# Patient Record
Sex: FEMALE | Race: BLACK OR AFRICAN AMERICAN | NOT HISPANIC OR LATINO | Employment: STUDENT | URBAN - METROPOLITAN AREA
[De-identification: names, ages, dates, MRNs, and addresses within clinical notes are randomized per-mention and may not be internally consistent; named-entity substitution may affect disease eponyms.]

---
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
## 2020-01-06 ENCOUNTER — OFFICE VISIT (OUTPATIENT)
Dept: ENDOCRINOLOGY | Facility: CLINIC | Age: 20
End: 2020-01-06
Payer: COMMERCIAL

## 2020-01-06 VITALS
BODY MASS INDEX: 21.66 KG/M2 | HEART RATE: 86 BPM | DIASTOLIC BLOOD PRESSURE: 53 MMHG | WEIGHT: 138 LBS | SYSTOLIC BLOOD PRESSURE: 109 MMHG | HEIGHT: 67 IN

## 2020-01-06 DIAGNOSIS — E10.9 WELL CONTROLLED TYPE 1 DIABETES MELLITUS (H): Primary | ICD-10-CM

## 2020-01-06 LAB — HBA1C MFR BLD: 5.1 % (ref 4.3–6)

## 2020-01-06 RX ORDER — INSULIN ASPART 100 [IU]/ML
INJECTION, SOLUTION INTRAVENOUS; SUBCUTANEOUS
Qty: 15 ML | Refills: 3 | Status: SHIPPED | OUTPATIENT
Start: 2020-01-06 | End: 2020-08-27

## 2020-01-06 ASSESSMENT — MIFFLIN-ST. JEOR: SCORE: 1433.59

## 2020-01-06 ASSESSMENT — PAIN SCALES - GENERAL: PAINLEVEL: NO PAIN (0)

## 2020-01-06 NOTE — PROGRESS NOTES
"Reason for visit/consult: Type 1 DM, post-pregnancy     Primary care provider: Engelmann, Lauren Anneliese     HPI:  Today's A1c is 5.1%  20 yo female with Type 1 DM, mostly controlled on diet alone, who I last saw 3/12/19. She has seen Soni Daniel on 10/14/19, see quote below:  \"Devin does understand that she has autoimmune type 1 diabetes and that she is likely honeymooning.  She understands that she did not have gestational diabetes and that it is very important to continue monitoring her glucose on the dexcom sensor.  She has a plan to take Novolog correction if her sugar goes over 175 mg/dL (1/75 over 175).  She has not required carb coverage or Lantus.  She has been wearing her dexcom G6 sensor with an overall average of 93 mg/dL the past week.  She has had no hypoglycemia, but reports she feels shaky if she does not eat.  She reports having very low appetite.  She is almost back to pre-pregnancy weight, which she is excited about. She feels good.  Her last a1c was 5.6%.\"    Devin has delivered a baby girl on 10/3/19 named Lavinia. Post-delivery she required no insulin. She is breastfeeding exclusively. She is not currently taking anything for her diabetes. She is feeling fine without any complaints.    She is using Dexcom to monitor glucose. She saw Rosibel in Diabetes Education in the past and benefited greatly from this.  Her blood sugars have been mostly ok with exception of some post-prandial hyperglycemia to 180s about 8x during the period 12/8/19-1/6/2020.  Average 108, very low 0.35, low 2.7%, in target 68.6%, high 28.7%, very high 0%, coefficient of variation 24.7%, SD 27, % time CGM active 91.4%.    Past Medical/Surgical History:  Past Medical History:   Diagnosis Date     Type I diabetes mellitus (H)     Recently Dx: Jan 2019     Past Surgical History:   Procedure Laterality Date     NO HISTORY OF SURGERY         Allergies:  No Known Allergies    Current Medications   Current Outpatient Medications " "  Medication     acetaminophen (TYLENOL) 325 MG tablet     Ascorbic Acid (VITAMIN C PO)     blood glucose (NO BRAND SPECIFIED) test strip     blood glucose (NO BRAND SPECIFIED) test strip     blood glucose (NO BRAND SPECIFIED) test strip     blood glucose (ONETOUCH VERIO IQ) test strip     blood glucose monitoring (NO BRAND SPECIFIED) meter device kit     Blood Pressure Monitoring (BLOOD PRESSURE CUFF) MISC     Cholecalciferol (VITAMIN D-3) 125 MCG (5000 UT) TABS     Continuous Blood Gluc Sensor (DEXCOM G6 SENSOR) MISC     glucagon (GLUCAGON EMERGENCY) 1 MG kit     Insulin Aspart (NOVOLOG SC)     insulin pen needle (32G X 4 MM) 32G X 4 MM miscellaneous     Prenatal Vit-Fe Fumarate-FA (TRINATE) TABS     No current facility-administered medications for this visit.        Family History:  Family History   Problem Relation Age of Onset     Gestational Diabetes Mother      Diabetes Type 2  Maternal Grandmother      Heart Disease Father      Hypertension Father      Glaucoma No family hx of      Macular Degeneration No family hx of        Social History:  In school pursuing nursing and is thinking about medical school, she is getting .    Social History     Tobacco Use     Smoking status: Never Smoker     Smokeless tobacco: Never Used   Substance Use Topics     Alcohol use: No     ROS:  negative except as mentioned in HPI    Exam  not currently breastfeeding.   /53   Pulse 86   Ht 1.702 m (5' 7\")   Wt 62.6 kg (138 lb)   BMI 21.61 kg/m    Gen: well appearing, nad, pleasant and conversant  HEENT: anicteric, EOMI, no proptosis or lid lag  Neuro: A&Ox3, no tremor    Labs/Imaging    ICA amparo <1:4 (on 2/14/19)  GAD65 amparo 72.1 (0.0-5.0)    ENDO DIABETES Latest Ref Rng & Units 10/11/2019   HEMOGLOBIN A1C 0 - 5.6 %    HEMOGLOBIN A1 95.0 - 97.9 %    HEMOGLOBIN A1C, POC 4.3 - 6 %    HGB 11.7 - 15.7 g/dL 12.1   GLUCOSE 70 - 99 mg/dL    CREATININE 0.50 - 1.00 mg/dL    POTASSIUM 3.4 - 5.3 mmol/L    ALT 0 - 50 U/L  "   AST 0 - 35 U/L    WBC 4.0 - 11.0 10e9/L 8.3   RBC 3.8 - 5.2 10e12/L 4.82   HGB 11.7 - 15.7 g/dL 12.1   HCT 35.0 - 47.0 % 38.5   MCV 78 - 100 fl 80   MCH 26.5 - 33.0 pg 25.1 (L)   MCHC 31.5 - 36.5 g/dL 31.4 (L)   RDW 10.0 - 15.0 % 16.6 (H)    - 450 10e9/L 440     Creatinine   Date Value Ref Range Status   10/08/2019 0.63 0.50 - 1.00 mg/dL Final     Lab Results   Component Value Date    A1C 6.0 04/05/2019    A1C 7.6 03/06/2019    A1C 8.8 02/18/2019    A1C 12.0 01/12/2019       Assessment and Plan    Type 1 DM, sub-optimal control, honeymooning and is requiring no insulin at present, however, her glucose gets up to 180s.  She feels comfortable wearing her dexcom sensor regularly. She is willing to begin taking some meal time insulin and using a correction scale when necessary and will follow up with Rosibel again.    Patient Instructions:    Start meal time insulin 1 unit per 30 grams of carbohydrates and a low intensity correction scale    Please see NATANAEL Gleason, Diabetes Education so you can get an insulin pump if you want one eventually    Please use the following correction scale:    LOW Insulin Resistance or Correction Factor of 1 for 100 mg/dL  Correction Scale    Do Not give Correction Insulin if Pre-Meal BG less than 140.   For Pre-Meal  - 239 give 1 unit.   For Pre-Meal  - 339 give 2 units.   For Pre-Meal BG greater than or equal to 340 give 3 units.   To be given with prandial insulin, and based on pre-meal blood glucose.   Notify provider if glucose greater than or equal to 350 mg/dL after administration of correction dose.     LOW Insulin Resistance or Correction Factor of 1 for 100 mg/dL  Do Not give Bedtime Correction Insulin if BG less than 200.   For  - 299 give 1 unit.   For  - 399 give 2 units   For BG greater than or equal 400 give 3 units.   Notify provider if glucose greater than or equal to 350 mg/dL after administration of correction dose.    Fasting blood sugar  goals are <130  2 hours after eating blood sugar goals are: <180    For questions about blood sugars, please call [phone] anytime & ask the  to page diabetes on-call.    RTC: Diabetes Education as needed, 1-3 months with Diabetes Team PA, 6 months Endocrinologist      Peyton Correia MD, MPH  Attending Physician  Diabetes/Endocrinology/Metabolism    Time: 30 min spent on evaluation, management, counseling, education, & motivational interviewing with greater than 50% of the total time was spent on counseling and coordinating care

## 2020-01-06 NOTE — LETTER
"1/6/2020       RE: Fabio Lacey  [address]  [place] [address]     Dear Colleague,    Thank you for referring your patient, Fabio Lacey, to the [facility] ENDOCRINOLOGY at [facility]. Please see a copy of my visit note below.    Reason for visit/consult: Type 1 DM, post-pregnancy     Primary care provider: Engelmann, Lauren Anneliese     HPI:  Today's A1c is 5.1%  20 yo female with Type 1 DM, mostly controlled on diet alone, who I last saw 3/12/19. She has seen Soni Daniel on 10/14/19, see quote below:  \"Devin does understand that she has autoimmune type 1 diabetes and that she is likely honeymooning.  She understands that she did not have gestational diabetes and that it is very important to continue monitoring her glucose on the dexcom sensor.  She has a plan to take Novolog correction if her sugar goes over 175 mg/dL (1/75 over 175).  She has not required carb coverage or Lantus.  She has been wearing her dexcom G6 sensor with an overall average of 93 mg/dL the past week.  She has had no hypoglycemia, but reports she feels shaky if she does not eat.  She reports having very low appetite.  She is almost back to pre-pregnancy weight, which she is excited about. She feels good.  Her last a1c was 5.6%.\"    Devin has delivered a baby girl on 10/3/19 named Lavinia. Post-delivery she required no insulin. She is breastfeeding exclusively. She is not currently taking anything for her diabetes. She is feeling fine without any complaints.    She is using Dexcom to monitor glucose. She saw Rosibel in Diabetes Education in the past and benefited greatly from this.  Her blood sugars have been  mostly ok with exception of some post-prandial hyperglycemia to 180s about 8x during the period 12/8/19-1/6/2020.  Average 108, very low 0.35, low 2.7%, in target 68.6%, high 28.7%, very high 0%, coefficient of variation 24.7%, SD 27, % time CGM active 91.4%.    Past " "Medical/Surgical History:  Past Medical History:   Diagnosis Date     Type I diabetes mellitus (H)     Recently Dx: Jan 2019     Past Surgical History:   Procedure Laterality Date     NO HISTORY OF SURGERY         Allergies:  No Known Allergies    Current Medications   Current Outpatient Medications   Medication     acetaminophen (TYLENOL) 325 MG tablet     Ascorbic Acid (VITAMIN C PO)     blood glucose (NO BRAND SPECIFIED) test strip     blood glucose (NO BRAND SPECIFIED) test strip     blood glucose (NO BRAND SPECIFIED) test strip     blood glucose (ONETOUCH VERIO IQ) test strip     blood glucose monitoring (NO BRAND SPECIFIED) meter device kit     Blood Pressure Monitoring (BLOOD PRESSURE CUFF) MISC     Cholecalciferol (VITAMIN D-3) 125 MCG (5000 UT) TABS     Continuous Blood Gluc Sensor (DEXCOM G6 SENSOR) MISC     glucagon (GLUCAGON EMERGENCY) 1 MG kit     Insulin Aspart (NOVOLOG SC)     insulin pen needle (32G X 4 MM) 32G X 4 MM miscellaneous     Prenatal Vit-Fe Fumarate-FA (TRINATE) TABS     No current facility-administered medications for this visit.        Family History:  Family History   Problem Relation Age of Onset     Gestational Diabetes Mother      Diabetes Type 2  Maternal Grandmother      Heart Disease Father      Hypertension Father      Glaucoma No family hx of      Macular Degeneration No family hx of        Social History:  In school pursuing nursing and is thinking about medical school, she is getting .    Social History     Tobacco Use     Smoking status: Never Smoker     Smokeless tobacco: Never Used   Substance Use Topics     Alcohol use: No     ROS:  negative except as mentioned in HPI    Exam  not currently breastfeeding.   /53   Pulse 86   Ht 1.702 m (5' 7\")   Wt 62.6 kg (138 lb)   BMI 21.61 kg/m     Gen: well appearing, nad, pleasant and conversant  HEENT: anicteric, EOMI, no proptosis or lid lag  Neuro: A&Ox3, no tremor    Labs/Imaging    ICA amparo <1:4 (on " 2/14/19)  GAD65 amparo 72.1 (0.0-5.0)    ENDO DIABETES Latest Ref Rng & Units 10/11/2019   HEMOGLOBIN A1C 0 - 5.6 %    HEMOGLOBIN A1 95.0 - 97.9 %    HEMOGLOBIN A1C, POC 4.3 - 6 %    HGB 11.7 - 15.7 g/dL 12.1   GLUCOSE 70 - 99 mg/dL    CREATININE 0.50 - 1.00 mg/dL    POTASSIUM 3.4 - 5.3 mmol/L    ALT 0 - 50 U/L    AST 0 - 35 U/L    WBC 4.0 - 11.0 10e9/L 8.3   RBC 3.8 - 5.2 10e12/L 4.82   HGB 11.7 - 15.7 g/dL 12.1   HCT 35.0 - 47.0 % 38.5   MCV 78 - 100 fl 80   MCH 26.5 - 33.0 pg 25.1 (L)   MCHC 31.5 - 36.5 g/dL 31.4 (L)   RDW 10.0 - 15.0 % 16.6 (H)    - 450 10e9/L 440     Creatinine   Date Value Ref Range Status   10/08/2019 0.63 0.50 - 1.00 mg/dL Final     Lab Results   Component Value Date    A1C 6.0 04/05/2019    A1C 7.6 03/06/2019    A1C 8.8 02/18/2019    A1C 12.0 01/12/2019   Assessment and Plan    Type 1 DM, sub-optimal control, honeymooning and is requiring no insulin at present, however, her glucose gets up to 180s.  She feels comfortable wearing her dexcom sensor regularly. She is willing to begin taking some meal time insulin and using a correction scale when necessary and will follow up with Rosibel again.    Patient Instructions:    Start meal time insulin 1 unit per 30 grams of carbohydrates and a low intensity correction scale    Please see NATANAEL Gleason, Diabetes Education so you can get an insulin pump if you want one eventually    Please use the following correction scale:    LOW Insulin Resistance or Correction Factor of 1 for 100 mg/dL  Correction Scale    Do Not give Correction Insulin if Pre-Meal BG less than 140.   For Pre-Meal  - 239 give 1 unit.   For Pre-Meal  - 339 give 2 units.   For Pre-Meal BG greater than or equal to 340 give 3 units.   To be given with prandial insulin, and based on pre-meal blood glucose.   Notify provider if glucose greater than or equal to 350 mg/dL after administration of correction dose.     LOW Insulin Resistance or Correction Factor of 1 for 100  mg/dL  Do Not give Bedtime Correction Insulin if BG less than 200.   For  - 299 give 1 unit.   For  - 399 give 2 units   For BG greater than or equal 400 give 3 units.   Notify provider if glucose greater than or equal to 350 mg/dL after administration of correction dose.    Fasting blood sugar goals are <130  2 hours after eating blood sugar goals are: <180    For questions about blood sugars, please call [phone] anytime & ask the  to page diabetes on-call.    RTC:  Diabetes Education as needed, 1-3 months with Diabetes Team PA, 6 months Endocrinologist      Peyton Correia MD, MPH  Attending Physician  Diabetes/Endocrinology/Metabolism    Time: 30 min spent on evaluation, management, counseling, education, & motivational interviewing with greater than 50% of the total time was spent on counseling and coordinating care

## 2020-01-06 NOTE — PATIENT INSTRUCTIONS
Start meal time insulin 1 unit per 30 grams of carbohydrates and a low intensity correction scale    Please see NATANAEL Gleason, Diabetes Education so you can get an insulin pump if you want one eventually    Please use the following correction scale:    LOW Insulin Resistance or Correction Factor of 1 for 100 mg/dL  Correction Scale    Do Not give Correction Insulin if Pre-Meal BG less than 140.   For Pre-Meal  - 239 give 1 unit.   For Pre-Meal  - 339 give 2 units.   For Pre-Meal BG greater than or equal to 340 give 3 units.   To be given with prandial insulin, and based on pre-meal blood glucose.   Notify provider if glucose greater than or equal to 350 mg/dL after administration of correction dose.     LOW Insulin Resistance or Correction Factor of 1 for 100 mg/dL  Do Not give Bedtime Correction Insulin if BG less than 200.   For  - 299 give 1 unit.   For  - 399 give 2 units   For BG greater than or equal 400 give 3 units.   Notify provider if glucose greater than or equal to 350 mg/dL after administration of correction dose.    Fasting blood sugar goals are <130  2 hours after eating blood sugar goals are: <180    For questions about blood sugars, please call [phone] anytime & ask the  to page diabetes on-call.

## 2020-01-28 ENCOUNTER — TELEPHONE (OUTPATIENT)
Dept: ENDOCRINOLOGY | Facility: CLINIC | Age: 20
End: 2020-01-28

## 2020-01-28 NOTE — TELEPHONE ENCOUNTER
[facility] Call Center    Phone Message    May a detailed message be left on voicemail: yes    Reason for Call: Medication Question or concern regarding medication   Prescription Clarification  Name of Medication: NOVOLOG FLEXPEN 100 UNIT/ML soln  Prescribing Provider: Peyton conley    Pharmacy:      [facility] - [place], [place] - [address]     What on the order needs clarification? They need to know daily dose or max number of units for insurance          Action Taken: Message routed to:  [facility] stephani

## 2020-02-12 ENCOUNTER — MEDICAL CORRESPONDENCE (OUTPATIENT)
Dept: HEALTH INFORMATION MANAGEMENT | Facility: CLINIC | Age: 20
End: 2020-02-12

## 2020-02-12 ENCOUNTER — MYC MEDICAL ADVICE (OUTPATIENT)
Dept: OBGYN | Facility: CLINIC | Age: 20
End: 2020-02-12

## 2020-02-13 NOTE — TELEPHONE ENCOUNTER
Called patient to discuss further. Pt describes (external) vulvar itching, inflammation, peeling skin. Denies use of new cleansing products. Denies shaving affected area. Has experienced occasional incontinence. Informed pt could be moisture related. Advised pt to clean and dry affected area. Can trial a barrier cream today but recommend clinic appt for further evaluation. Pt expressed understanding and scheduled with Dr. Giraldo tomorrow.

## 2020-03-11 ENCOUNTER — HEALTH MAINTENANCE LETTER (OUTPATIENT)
Age: 20
End: 2020-03-11

## 2020-03-20 ENCOUNTER — TELEPHONE (OUTPATIENT)
Dept: ENDOCRINOLOGY | Facility: CLINIC | Age: 20
End: 2020-03-20

## 2020-03-20 NOTE — TELEPHONE ENCOUNTER
Soni does not work on Fridays and patients employer is requesting letter today and forms by Tuesday.     Soni, I mimicked a letter written for another patient and have the forms for you to sign on Monday

## 2020-03-20 NOTE — TELEPHONE ENCOUNTER
[facility] Call Center    Phone Message    May a detailed message be left on voicemail: yes     Reason for Call: Form or Letter   Type or form/letter needing completion: Pt requires a note to employer that it is safe for her to continue to work.  She believes her boss is trying to fire her based on her diagnosis as a vunerable person during COVID 19.  Provider: Soni Daniel PA-C  Date form needed: today  Once completed: Fax form to: get number from Pt      Pt is in tears and terrifies of losing her job.  Please call back right away    Action Taken: Message routed to:  [facility] endocrine    Travel Screening: Not Applicable

## 2020-03-20 NOTE — TELEPHONE ENCOUNTER
[facility] Call Center    Phone Message    May a detailed message be left on voicemail: yes     Reason for Call: Other: Pt caled back and stated a nurse left a VM for pt. Pt is calling back. Please call back pt Thanks.     Action Taken: Message routed to:  [facility] ENDO    Travel Screening: Not Applicable

## 2020-03-23 ENCOUNTER — MYC MEDICAL ADVICE (OUTPATIENT)
Dept: ENDOCRINOLOGY | Facility: CLINIC | Age: 20
End: 2020-03-23

## 2020-03-29 NOTE — TELEPHONE ENCOUNTER
Patient called with concern r/t blood sugars in recent days. On continuous glucose monitoring and average has been 200. Pt states she spoke with endocrine who advised (per patient report) levels not concerning and take insulin in adjusted doses for blood sugar (sliding scale) as ordered. Patient does not feel comfortable with this and is also symptomatic of nausea and generally feeling unwell.     Discussed with Zoë Armstrong and Dr. Scott in clinic who advise pt come to clinic for OB appt. Scheduled today.   
no

## 2020-03-30 ENCOUNTER — VIRTUAL VISIT (OUTPATIENT)
Dept: ENDOCRINOLOGY | Facility: CLINIC | Age: 20
End: 2020-03-30
Payer: COMMERCIAL

## 2020-03-30 DIAGNOSIS — L08.9 LOCAL INFECTION OF SKIN AND SUBCUTANEOUS TISSUE: ICD-10-CM

## 2020-03-30 DIAGNOSIS — E10.9 WELL CONTROLLED TYPE 1 DIABETES MELLITUS (H): Primary | ICD-10-CM

## 2020-03-30 RX ORDER — INSULIN ASPART 100 [IU]/ML
INJECTION, SOLUTION INTRAVENOUS; SUBCUTANEOUS
Qty: 15 ML | Refills: 3 | Status: SHIPPED | OUTPATIENT
Start: 2020-03-30 | End: 2022-04-26

## 2020-03-30 NOTE — PROGRESS NOTES
"This visit was converted from an in person visit to a virtual visit due to the ongoing COVID-19 pandemic.    Start time: 1338  End time: 1404    HPI: Devin is a 18 yo woman here for follow up of type 1 diabetes, diagnosed just prior to pregnancy in January, 2019. She also sees Dr. Correia.  Since delivery, she has required very little insulin and she continues wearing a dexcom sensor.      She has been taking 1/30g with \"very sugary foods.\" No long acting insulin.  She feels sugars are higher.  She is not taking as much insulin as she feels she should be.      She is no longer with the father of her baby.  She will be moving into her mom's house soon.  Mom is moving to [facility] in May and taking her daughter, Lavinia.  Devin plans to have her daughter back once she finishes college and plans to visit [place] whenever possible.  She feels this is the best possible arrangement for now.      Devin does understand that she has autoimmune type 1 diabetes and that she is likely honeymooning.  She understands that she did not have gestational diabetes and that it is very important to continue monitoring her glucose on the dexcom sensor.  She has been wearing her dexcom G6 sensor with an overall average of 135 mg/dL the past week (up from 93 mg/dL at her last visit).  She has high spikes after eating (up as high as 190) and occasional hypoglycemia after eating.  She feels good. Her last a1c was 5.1% on 1/6/20.       Past Medical History:   Diagnosis Date     Type I diabetes mellitus (H)     Recently Dx: Jan 2019       Past Surgical History:   Procedure Laterality Date     NO HISTORY OF SURGERY         Family History   Problem Relation Age of Onset     Gestational Diabetes Mother      Diabetes Type 2  Maternal Grandmother      Heart Disease Father      Hypertension Father      Glaucoma No family hx of      Macular Degeneration No family hx of    Maternal grandmother-   Social Hx:   since 2018.  Lives with Swathi, her " boyfriend. Taking classes at [employer]. She is [place], but was born in [facility].  Came to the [place] at age 4.     Social History     Socioeconomic History     Marital status:      Spouse name: Swathi Castro     Number of children: None     Years of education: None     Highest education level: None   Occupational History     Occupation: Student    Social Needs     Financial resource strain: None     Food insecurity:     Worry: None     Inability: None     Transportation needs:     Medical: None     Non-medical: None   Tobacco Use     Smoking status: Never Smoker     Smokeless tobacco: Never Used   Substance and Sexual Activity     Alcohol use: No     Drug use: No     Sexual activity: Yes     Partners: Male   Lifestyle     Physical activity:     Days per week: None     Minutes per session: None     Stress: None   Relationships     Social connections:     Talks on phone: None     Gets together: None     Attends Yarsani service: None     Active member of club or organization: None     Attends meetings of clubs or organizations: None     Relationship status: None     Intimate partner violence:     Fear of current or ex partner: None     Emotionally abused: None     Physically abused: None     Forced sexual activity: None   Other Topics Concern     None   Social History Narrative    How much exercise per week? none    How much calcium per day? PNV, some foods        How much caffeine per day? none    How much vitamin D per day? PNV    Do you/your family wear seatbelts?  Yes    Do you/your family use safety helmets? n/a    Do you/your family use sunscreen? When in sun    Do you/your family keep firearms in the home? No    Do you/your family have a smoke detector(s)? Yes        March 6, 2019 Adrien James LPN       Current Outpatient Medications   Medication     acetaminophen (TYLENOL) 325 MG tablet     Ascorbic Acid (VITAMIN C PO)     blood glucose (NO BRAND SPECIFIED) test strip     blood glucose (NO BRAND SPECIFIED) test strip      blood glucose (NO BRAND SPECIFIED) test strip     blood glucose (ONETOUCH VERIO IQ) test strip     blood glucose monitoring (NO BRAND SPECIFIED) meter device kit     Blood Pressure Monitoring (BLOOD PRESSURE CUFF) MISC     Cholecalciferol (VITAMIN D-3) 125 MCG (5000 UT) TABS     Continuous Blood Gluc Sensor (DEXCOM G6 SENSOR) MISC     glucagon (GLUCAGON EMERGENCY) 1 MG kit     Insulin Aspart (NOVOLOG SC)     insulin pen needle (32G X 4 MM) 32G X 4 MM miscellaneous     NOVOLOG FLEXPEN 100 UNIT/ML soln     Prenatal Vit-Fe Fumarate-FA (TRINATE) TABS     No current facility-administered medications for this visit.         No Known Allergies    Physical Exam- none.  Phone visit.   RESULTS  Lab Results   Component Value Date    A1C 6.0 (H) 04/05/2019    A1C 7.6 (H) 03/06/2019    A1C 8.8 (H) 02/18/2019    A1C 12.0 (H) 01/12/2019    HEMOGLOBINA1 5.1 01/06/2020    HEMOGLOBINA1 5.1 07/12/2019    HEMOGLOBINA1 5.3 05/24/2019    HEMOGLOBINA1 5.7 04/01/2019    HEMOGLOBINA1 10.8 (A) 01/23/2019       TSH   Date Value Ref Range Status   04/05/2019 1.59 0.40 - 4.00 mU/L Final   05/14/2018 1.48 0.40 - 4.00 mU/L Final       ALT   Date Value Ref Range Status   10/10/2019 25 0 - 50 U/L Final   10/08/2019 31 0 - 50 U/L Final   ]    No results for input(s): CHOL, HDL, LDL, TRIG, CHOLHDLRATIO in the last 86536 hours.    Lab Results   Component Value Date     10/07/2019      Lab Results   Component Value Date    POTASSIUM 3.6 10/07/2019     Lab Results   Component Value Date    CHLORIDE 108 10/07/2019     Lab Results   Component Value Date    UNA 8.3 10/07/2019     Lab Results   Component Value Date    CO2 25 10/07/2019     Lab Results   Component Value Date    BUN 12 10/07/2019     Lab Results   Component Value Date    CR 0.63 10/08/2019       GFR Estimate   Date Value Ref Range Status   10/08/2019 >90 >60 mL/min/[1.73_m2] Final     Comment:     Non  GFR Calc  Starting 12/18/2018, serum creatinine based estimated  GFR (eGFR) will be   calculated using the Chronic Kidney Disease Epidemiology Collaboration   (CKD-EPI) equation.     10/07/2019 >90 >60 mL/min/[1.73_m2] Final     Comment:     Non  GFR Calc  Starting 12/18/2018, serum creatinine based estimated GFR (eGFR) will be   calculated using the Chronic Kidney Disease Epidemiology Collaboration   (CKD-EPI) equation.     10/01/2019 >90 >60 mL/min/[1.73_m2] Final     Comment:     Non  GFR Calc  Starting 12/18/2018, serum creatinine based estimated GFR (eGFR) will be   calculated using the Chronic Kidney Disease Epidemiology Collaboration   (CKD-EPI) equation.       GFR Estimate If Black   Date Value Ref Range Status   10/08/2019 >90 >60 mL/min/[1.73_m2] Final     Comment:      GFR Calc  Starting 12/18/2018, serum creatinine based estimated GFR (eGFR) will be   calculated using the Chronic Kidney Disease Epidemiology Collaboration   (CKD-EPI) equation.     10/07/2019 >90 >60 mL/min/[1.73_m2] Final     Comment:      GFR Calc  Starting 12/18/2018, serum creatinine based estimated GFR (eGFR) will be   calculated using the Chronic Kidney Disease Epidemiology Collaboration   (CKD-EPI) equation.     10/01/2019 >90 >60 mL/min/[1.73_m2] Final     Comment:      GFR Calc  Starting 12/18/2018, serum creatinine based estimated GFR (eGFR) will be   calculated using the Chronic Kidney Disease Epidemiology Collaboration   (CKD-EPI) equation.         No results found for: MICROL  No results found for: MICROALBUMIN  Lab Results   Component Value Date    CPEPT 3.1 07/10/2019    GADAB 72.1 (H) 02/14/2019    ISCAB <1:4 02/14/2019       No results found for: B12]    Most recent eye exam date: : Not Found       GFR Estimate   Date Value Ref Range Status   10/08/2019 >90 >60 mL/min/[1.73_m2] Final     Comment:     Non  GFR Calc  Starting 12/18/2018, serum creatinine based estimated GFR (eGFR) will be    calculated using the Chronic Kidney Disease Epidemiology Collaboration   (CKD-EPI) equation.     10/07/2019 >90 >60 mL/min/[1.73_m2] Final     Comment:     Non  GFR Calc  Starting 12/18/2018, serum creatinine based estimated GFR (eGFR) will be   calculated using the Chronic Kidney Disease Epidemiology Collaboration   (CKD-EPI) equation.     10/01/2019 >90 >60 mL/min/[1.73_m2] Final     Comment:     Non  GFR Calc  Starting 12/18/2018, serum creatinine based estimated GFR (eGFR) will be   calculated using the Chronic Kidney Disease Epidemiology Collaboration   (CKD-EPI) equation.       GFR Estimate If Black   Date Value Ref Range Status   10/08/2019 >90 >60 mL/min/[1.73_m2] Final     Comment:      GFR Calc  Starting 12/18/2018, serum creatinine based estimated GFR (eGFR) will be   calculated using the Chronic Kidney Disease Epidemiology Collaboration   (CKD-EPI) equation.     10/07/2019 >90 >60 mL/min/[1.73_m2] Final     Comment:      GFR Calc  Starting 12/18/2018, serum creatinine based estimated GFR (eGFR) will be   calculated using the Chronic Kidney Disease Epidemiology Collaboration   (CKD-EPI) equation.     10/01/2019 >90 >60 mL/min/[1.73_m2] Final     Comment:      GFR Calc  Starting 12/18/2018, serum creatinine based estimated GFR (eGFR) will be   calculated using the Chronic Kidney Disease Epidemiology Collaboration   (CKD-EPI) equation.         No results found for: MICROL  No results found for: MICROALBUMIN  Lab Results   Component Value Date    CPEPT 3.1 07/10/2019    GADAB 72.1 (H) 02/14/2019    ISCAB <1:4 02/14/2019       No results found for: B12]    Most recent eye exam date: : Not Found     Glutamic Acid Decarboxylase Antibody   Date Value Ref Range Status   02/14/2019 72.1 (H) 0.0 - 5.0 IU/mL Final     Comment:     (Note)  INTERPRETIVE INFORMATION:  Glutamic Acid Decarboxylase   Antibody  A value greater than 5.0 IU/mL  is considered positive for   Glutamic Acid Decarboxylase Antibody (KEISHA Ab). This assay   is intended for the semi-quantitative determination of the   KEISHA Ab in human serum. Results should be interpreted within   the context of clinical symptoms.  Performed by [facility],  [address] [facility] [address] [phone]  [email or web address], Solomon Clark MD, Lab. Director       C Peptide   Date Value Ref Range Status   07/10/2019 3.1 0.9 - 6.9 ng/mL Final         Assessment/Plan:     1.  Type 1 diabetes- Sugars are climbing a bit, particularly post-prandially.  Discussed IN-pen and she is interested.  Will set up with Rosibel Garcia.  Will also start taking 1/30g carb plus correction of 1/100 over 175 mg/dL.  We spent much time discussing the fact that this is type 1 diabetes and NOT GDM and that she could go into DKA if she does not have insulin.  She is likely honeymooning, and her insulin requirements could increase rapidly, particularly in the context of illness.       She feels comfortable wearing her dexcom sensor regularly and watching her glucose closely.  Will contact me if she sees her glucose values going over 140 mg/dL.     2. Nose pain- following piercing.  Photos shows small amout of redness and drainage.  Asked her to cleanse area with soapy water, apply antibiotic ointment.  If not improving, will start cephalexin. I sent rx to pharmacy.  She will let me know if symptoms persist or worsen.     3.  F/U in 3 months, sooner with hyperglycemia.     I spent 26 minutes with this patient on the phone and explained the conditions and plans (more than 50% of time was counseling/coordination of care, diabetes management, follow up plan for worsening hyper and hypoglycemia) . The patient understood and is satisfied with today's visit.      Soni Daniel PA-C, MPAS   [facility]  Department of Medicine  Division of Endocrinology and Diabetes

## 2020-03-30 NOTE — PATIENT INSTRUCTIONS
Download In-pen ricky on your phone.      Set up appointment with Rosibel to set up the inpen.      1/30g  Correction - 100   Target glucose- 120

## 2020-04-01 RX ORDER — CEPHALEXIN 500 MG/1
500 TABLET ORAL 4 TIMES DAILY
Qty: 28 TABLET | Refills: 0 | Status: SHIPPED | OUTPATIENT
Start: 2020-04-01 | End: 2022-03-21

## 2020-04-07 ENCOUNTER — TELEPHONE (OUTPATIENT)
Dept: ENDOCRINOLOGY | Facility: CLINIC | Age: 20
End: 2020-04-07

## 2020-04-07 NOTE — TELEPHONE ENCOUNTER
Sent [facility] for pt to set up 3 month follow up with Soni, and to cancel/reschedule Dr Bren garcíat

## 2020-04-21 ENCOUNTER — TELEPHONE (OUTPATIENT)
Dept: ENDOCRINOLOGY | Facility: CLINIC | Age: 20
End: 2020-04-21

## 2020-04-21 NOTE — TELEPHONE ENCOUNTER
Prior Authorization Retail Medication Request    Medication/Dose: InPen  ICD code (if different than what is on RX): E10.9  Rationale: Patient needs in accurate dosage of insulin to manage diabetes     Insurance Name: [facility]  Insurance ID: [id number]       Pharmacy Information (if different than what is on RX)  Name:    Phone:

## 2020-04-22 NOTE — TELEPHONE ENCOUNTER
PA Initiation    Medication: InPen 100-Grey- Rip -   Insurance Company: [employer] - Phone [phone] Fax [phone]  Pharmacy Filling the Rx: [id number] - [place], [place] - [address] [place] [address]  Filling Pharmacy Phone: [phone]  Filling Pharmacy Fax: [phone]  Start Date: 4/22/2020

## 2020-04-23 ENCOUNTER — TELEPHONE (OUTPATIENT)
Dept: ENDOCRINOLOGY | Facility: CLINIC | Age: 20
End: 2020-04-23

## 2020-04-23 NOTE — TELEPHONE ENCOUNTER
Prior Authorization Approval    Medication: InPen 100-Grey- Rip - APPROVED was approved on 4/22/2020  Effective: 3/23/2020 to 4/22/2021  Reference #:    Approved Dose/Quantity:   Insurance Company: [employer] - Phone [phone] Fax [phone]  Expected CoPay:    Pharmacy Filling the Rx: [id number] - [place], [place] - [address]  Pharmacy Notified: Yes  Patient Notified: Comment:  **Instructed pharmacy to notify patient when script is ready to /ship.**

## 2020-04-23 NOTE — TELEPHONE ENCOUNTER
"Prior Authorization Specialty Medication Request    Medication/Dose: Injection Device for insulin (INPEN 100-GREY-JANET) TALI   ICD code (if different than what is on RX):    Previously Tried and Failed:      Important Lab Values:   Hemoglobin A1C  4.3 - 6 %  5.1       Rationale: 1.  Type 1 diabetes- Sugars are climbing a bit, particularly post-prandially.  Discussed IN-pen and she is interested.  Will set up with Rosibel Radha.  Will also start taking 1/30g carb plus correction of 1/100 over 175 mg/dL.  We spent much time discussing the fact that this is type 1 diabetes and NOT GDM and that she could go into DKA if she does not have insulin.     Insurance Name:   Insurance ID:   Insurance Phone Number:     Pharmacy Information (if different than what is on RX)  Name:    Phone:                RE    From: Rosibel Garcia <[email or web address]>  Sent: Monday, April 20, 2020 3:02 PM  To: Latisha Huang <[email or web address]>  Subject: Fw: InPen PA needed     Tana Good, Can you forward to PA team?  Thanks,  Rosibel  From: Nitza Gould <[email or web address]>  Sent: Monday, April 20, 2020 11:48 AM  To: Rosibel Garcia <[email or web address]>  Subject: InPen PA needed     CAUTION: This email originated from outside of the organization. Do not click links or open attachments unless you recognize the sender and know the content is safe.  WARNING: The sender of this email could not be validated and may not match the person in the \"From\" field. When possible, visit any website by directly typing it in a web browser, instead of clicking links, for added safety.  Edwar Gleason     I had Stefany Page reach out to me for an InPen patient of Soni Mccarthy 'Devin' [facility].   Phone number: [phone]    She has [employer] and can get 2 InPens for $0 once a PA is submitted/approved. Can you submit one or ask Latisha to?     Thanks!   -Nitza Gould    " Medical  [place]     Cell: [phone]  [email or web address]      Youtube Page:   [email or web address]    This e-mail and any files transmitted with it is intended solely for the addressee(s) and may contain confidential and/or   legally privileged information.  If you are not the addressee(s), any disclosure, reproduction, copying, distribution or other   use of this e-mail is prohibited.  If you have received this e-mail in error, please delete it and notify the sender  immediately via return E-mail.

## 2020-05-18 ENCOUNTER — MYC MEDICAL ADVICE (OUTPATIENT)
Dept: ENDOCRINOLOGY | Facility: CLINIC | Age: 20
End: 2020-05-18

## 2020-05-27 ENCOUNTER — TELEPHONE (OUTPATIENT)
Dept: ENDOCRINOLOGY | Facility: CLINIC | Age: 20
End: 2020-05-27

## 2020-05-27 DIAGNOSIS — E10.9 WELL CONTROLLED TYPE 1 DIABETES MELLITUS (H): ICD-10-CM

## 2020-05-27 NOTE — TELEPHONE ENCOUNTER
----- Message from Soni Daniel PA-C sent at 5/23/2020 12:59 PM CDT -----  Regarding: In-pen PA status  I talked to Devin and she said she still has not received the In-pen.  Can you please follow up on this?   Thank you!  Soni

## 2020-06-19 ENCOUNTER — TELEPHONE (OUTPATIENT)
Dept: ENDOCRINOLOGY | Facility: CLINIC | Age: 20
End: 2020-06-19

## 2020-06-19 DIAGNOSIS — E10.9 WELL CONTROLLED TYPE 1 DIABETES MELLITUS (H): Primary | ICD-10-CM

## 2020-06-21 RX ORDER — INSULIN ASPART 100 [IU]/ML
INJECTION, SOLUTION INTRAVENOUS; SUBCUTANEOUS
Qty: 15 ML | Refills: 3 | Status: SHIPPED | OUTPATIENT
Start: 2020-06-21 | End: 2020-11-28

## 2020-07-08 ENCOUNTER — OFFICE VISIT (OUTPATIENT)
Dept: OBGYN | Facility: CLINIC | Age: 20
End: 2020-07-08
Attending: OBSTETRICS & GYNECOLOGY
Payer: COMMERCIAL

## 2020-07-08 ENCOUNTER — OFFICE VISIT (OUTPATIENT)
Dept: OBGYN | Facility: CLINIC | Age: 20
End: 2020-07-08
Attending: ADVANCED PRACTICE MIDWIFE
Payer: COMMERCIAL

## 2020-07-08 VITALS
SYSTOLIC BLOOD PRESSURE: 107 MMHG | HEIGHT: 67 IN | DIASTOLIC BLOOD PRESSURE: 67 MMHG | BODY MASS INDEX: 21.52 KG/M2 | HEART RATE: 84 BPM | WEIGHT: 137.1 LBS

## 2020-07-08 DIAGNOSIS — B37.31 YEAST INFECTION OF THE VAGINA: Primary | ICD-10-CM

## 2020-07-08 DIAGNOSIS — Z30.46 NEXPLANON REMOVAL: Primary | ICD-10-CM

## 2020-07-08 PROCEDURE — 11982 REMOVE DRUG IMPLANT DEVICE: CPT | Mod: ZF | Performed by: ADVANCED PRACTICE MIDWIFE

## 2020-07-08 RX ORDER — FLUCONAZOLE 150 MG/1
TABLET ORAL
Qty: 3 TABLET | Refills: 0 | Status: SHIPPED | OUTPATIENT
Start: 2020-07-08 | End: 2022-03-21

## 2020-07-08 RX ORDER — CLOBETASOL PROPIONATE 0.5 MG/G
CREAM TOPICAL 2 TIMES DAILY
Qty: 60 G | Refills: 3 | Status: SHIPPED | OUTPATIENT
Start: 2020-07-08 | End: 2022-03-21

## 2020-07-08 ASSESSMENT — MIFFLIN-ST. JEOR: SCORE: 1424.51

## 2020-07-08 NOTE — LETTER
7/8/2020       RE: Fabio Lacey  [address]  [facility] [address]     Dear Colleague,    Thank you for referring your patient, Fabio Lacey, to the [facility] at [facility]. Please see a copy of my visit note below.      Nexplanon Removal:     Is a pregnancy test required: No.  Was a consent obtained?  Yes    Fabio Lacey is here for removal of etonogestrel implant Nexplanon/Implanon    Indication: pt no longer desires contraception    Pt counseled by Dr. Wright.   No questions or concerns about procedure.    Preoperative Diagnosis: etonogestrel implant  Postoperative Diagnosis: etonogestrel implant removed    Technique: On the right arm  Skin prep Betadine  Anesthesia 1% lidocaine  Procedure: Small incision (<5mm) was made at distal end of palpable implant, curved hemostat or mosquito forceps was used to isolate the implant and bring it to the incision, the fibrous capsule containing the implant  was incised and the Implant was removed intact.    EBL: minimal  Complications:  No  Tolerance:  Pt tolerated procedure well and was in stable condition.   Dressing:    A pressure bandage was placed for the next 12-24 hours.    Contraception was discussed and patient chose the following method: abstinence      Follow up:     Encouraged to monitor for signs of infection. Pt was instructed to call if bleeding, severe pain or foul smell.   Return to clinic in 1 year for pap/annual, earlier prn    ADA Mosqueda CNM

## 2020-07-08 NOTE — PROGRESS NOTES
19 yo  on Nexplanon desires check up for persistent yeast sx and labial irritation.     OB History    Para Term  AB Living   1 1 1 0 0 1   SAB TAB Ectopic Multiple Live Births   0 0 0 0 1      # Outcome Date GA Lbr Paresh/2nd Weight Sex Delivery Anes PTL Lv   1 Term 10/03/19 37w2d 09:49 / 00:52 3.232 kg (7 lb 2 oz) F Vag-Spont EPI N LORENZO      Name: LAURA SINGH      Apgar1: 9  Apgar5: 9     Past Medical History:   Diagnosis Date     Type I diabetes mellitus (H)     Recently Dx: 2019     Social History     Socioeconomic History     Marital status: Single     Spouse name: Swathi Singh     Number of children: None     Years of education: None     Highest education level: None   Occupational History     Occupation: Student    Social Needs     Financial resource strain: None     Food insecurity     Worry: None     Inability: None     Transportation needs     Medical: None     Non-medical: None   Tobacco Use     Smoking status: Never Smoker     Smokeless tobacco: Never Used   Substance and Sexual Activity     Alcohol use: No     Drug use: No     Sexual activity: Yes     Partners: Male     Birth control/protection: None   Lifestyle     Physical activity     Days per week: None     Minutes per session: None     Stress: None   Relationships     Social connections     Talks on phone: None     Gets together: None     Attends Anabaptist service: None     Active member of club or organization: None     Attends meetings of clubs or organizations: None     Relationship status: None     Intimate partner violence     Fear of current or ex partner: None     Emotionally abused: None     Physically abused: None     Forced sexual activity: None   Other Topics Concern     None   Social History Narrative    20                How much exercise per week? none    How much calcium per day? PNV, some foods        How much caffeine per day? none    How much vitamin D per day? PNV    Do you/your family wear seatbelts?  Yes    Do  "you/your family use safety helmets? n/a    Do you/your family use sunscreen? When in sun    Do you/your family keep firearms in the home? No    Do you/your family have a smoke detector(s)? Yes        March 6, 2019 Adrien James LPN     /67   Pulse 84   Ht 1.702 m (5' 7\")   Wt 62.2 kg (137 lb 1.6 oz)   BMI 21.47 kg/m    Appears well  Abdomen soft NT ND  EG thick white vaginal discharge, labia majora with erythematous papular rash and excoriations.     A/P:  Persistent candida vaginitis in setting of DM: diflucan weekly times 3; improve DM control    \"diaper\" like rash: clobetasol (esternally only!!)    RTC prn    Anastasia Wright MD    "

## 2020-07-08 NOTE — PROGRESS NOTES
Nexplanon Removal:     Is a pregnancy test required: No.  Was a consent obtained?  Yes    Fabio Lacey is here for removal of etonogestrel implant Nexplanon/Implanon    Indication: pt no longer desires contraception    Pt counseled by Dr. Wright.   No questions or concerns about procedure.    Preoperative Diagnosis: etonogestrel implant  Postoperative Diagnosis: etonogestrel implant removed    Technique: On the right arm  Skin prep Betadine  Anesthesia 1% lidocaine  Procedure: Small incision (<5mm) was made at distal end of palpable implant, curved hemostat or mosquito forceps was used to isolate the implant and bring it to the incision, the fibrous capsule containing the implant  was incised and the Implant was removed intact.    EBL: minimal  Complications:  No  Tolerance:  Pt tolerated procedure well and was in stable condition.   Dressing:    A pressure bandage was placed for the next 12-24 hours.    Contraception was discussed and patient chose the following method: abstinence      Follow up:     Encouraged to monitor for signs of infection. Pt was instructed to call if bleeding, severe pain or foul smell.   Return to clinic in 1 year for pap/annual, earlier prn    ADA Mosqueda CNM

## 2020-07-08 NOTE — LETTER
2020       RE: Fabio HERRERA Abhijit  [address]  [facility] [address]     Dear Colleague,    Thank you for referring your patient, Fabio Lacey, to the [facility] at [facility]. Please see a copy of my visit note below.    21 yo  on Nexplanon desires check up for persistent yeast sx and labial irritation.     OB History    Para Term  AB Living   1 1 1 0 0 1   SAB TAB Ectopic Multiple Live Births   0 0 0 0 1      # Outcome Date GA Lbr Paresh/2nd Weight Sex Delivery Anes PTL Lv   1 Term 10/03/19 37w2d 09:49 / 00:52 3.232 kg (7 lb 2 oz) F Vag-Spont EPI N LORENZO      Name: LAURA SINGH      Apgar1: 9  Apgar5: 9     Past Medical History:   Diagnosis Date     Type I diabetes mellitus (H)     Recently Dx: 2019     Social History     Socioeconomic History     Marital status: Single     Spouse name: Dagobertoal Gloria     Number of children: None     Years of education: None     Highest education level: None   Occupational History     Occupation: Student    Social Needs     Financial resource strain: None     Food insecurity     Worry: None     Inability: None     Transportation needs     Medical: None     Non-medical: None   Tobacco Use     Smoking status: Never Smoker     Smokeless tobacco: Never Used   Substance and Sexual Activity     Alcohol use: No     Drug use: No     Sexual activity: Yes     Partners: Male     Birth control/protection: None   Lifestyle     Physical activity     Days per week: None     Minutes per session: None     Stress: None   Relationships     Social connections     Talks on phone: None     Gets together: None     Attends Yarsani service: None     Active member of club or organization: None     Attends meetings of clubs or organizations: None     Relationship status: None     Intimate partner violence     Fear of current or ex partner: None     Emotionally abused: None     Physically abused: None     Forced  "sexual activity: None   Other Topics Concern     None   Social History Narrative    7/8/20                How much exercise per week? none    How much calcium per day? PNV, some foods        How much caffeine per day? none    How much vitamin D per day? PNV    Do you/your family wear seatbelts?  Yes    Do you/your family use safety helmets? n/a    Do you/your family use sunscreen? When in sun    Do you/your family keep firearms in the home? No    Do you/your family have a smoke detector(s)? Yes        March 6, 2019 Adrien James LPN     /67   Pulse 84   Ht 1.702 m (5' 7\")   Wt 62.2 kg (137 lb 1.6 oz)   BMI 21.47 kg/m    Appears well  Abdomen soft NT ND  EG thick white vaginal discharge, labia majora with erythematous papular rash and excoriations.     A/P:  Persistent candida vaginitis in setting of DM: diflucan weekly times 3; improve DM control    \"diaper\" like rash: clobetasol (esternally only!!)    RTC prn    Anastasia Wright MD          "

## 2020-07-10 NOTE — PROGRESS NOTES
"Fabio Lacey is a 20 year old female who is being evaluated via a billable video visit.      The patient has been notified of following:     \"This video visit will be conducted via a call between you and your physician/provider. We have found that certain health care needs can be provided without the need for an in-person physical exam.  This service lets us provide the care you need with a video conversation.  If a prescription is necessary we can send it directly to your pharmacy.  If lab work is needed we can place an order for that and you can then stop by our lab to have the test done at a later time.    Video visits are billed at different rates depending on your insurance coverage.  Please reach out to your insurance provider with any questions.    If during the course of the call the physician/provider feels a video visit is not appropriate, you will not be charged for this service.\"    Patient has given verbal consent for Video visit? Yes  How would you like to obtain your AVS? Mail a copy  Patient would like the video invitation sent by: Send to e-mail at: [email or web address]  Will anyone else be joining your video visit? No    VISIT HAD TO BE CONVERTED TO PHONE VISIT DUE TO TECHNICAL ISSUES ON PATIENT'S END. HE DID NOT RESPOND TO VIDEO VISIT INVITE, SO PHYSICIAN CALLED PATIENT.    Fabio Lacey is a 20 year old female who is being evaluated via a billable telephone visit.      The patient has been notified of following:     \"This telephone visit will be conducted via a call between you and your physician/provider. We have found that certain health care needs can be provided without the need for a physical exam.  This service lets us provide the care you need with a short phone conversation.  If a prescription is necessary we can send it directly to your pharmacy.  If lab work is needed we can place an order for that and you can then stop by our lab to have the test done at a later " "time.    Telephone visits are billed at different rates depending on your insurance coverage. During this emergency period, for some insurers they may be billed the same as an in-person visit.  Please reach out to your insurance provider with any questions.    If during the course of the call the physician/provider feels a telephone visit is not appropriate, you will not be charged for this service.\"    Patient has given verbal consent for Telephone visit?  Yes    What phone number would you like to be contacted at? [phone]    How would you like to obtain your AVS? Bella    Phone call duration: 7 minutes 48 seconds    Reason for visit/consult: Type 1 DM, post-pregnancy     Primary care provider: Engelmann, Lauren Anneliese     HPI:  A1c was 5.1%  19 yo female with Type 1 DM, mostly controlled on diet alone, who I last saw 3/12/19. She has seen Soni Daniel on 10/14/19, see quote below:  \"Devin does understand that she has autoimmune type 1 diabetes and that she is likely honeymooning.  She understands that she did not have gestational diabetes and that it is very important to continue monitoring her glucose on the dexcom sensor.  She has a plan to take Novolog correction if her sugar goes over 175 mg/dL (1/75 over 175).  She has not required carb coverage or Lantus.  She has been wearing her dexcom G6 sensor with an overall average of 93 mg/dL the past week.  She has had no hypoglycemia, but reports she feels shaky if she does not eat.  She reports having very low appetite.  She is almost back to pre-pregnancy weight, which she is excited about. She feels good.  Her last a1c was 5.6%.\"     Devin has delivered a baby girl on 10/3/19 named Lavinia. Post-delivery she required no insulin. She is breastfeeding exclusively. She is not currently taking anything for her diabetes. She is feeling fine without any complaints.     She is having some hyperglycemia. No objective glucose data download is available at time of " visit.    She saw Rosibel in Diabetes Education in the past and benefited greatly from this.       Past Medical/Surgical History:  Past Medical History:   Diagnosis Date     Type I diabetes mellitus (H)     Recently Dx: Jan 2019     Past Surgical History:   Procedure Laterality Date     NO HISTORY OF SURGERY         Allergies:  No Known Allergies    Current Medications   Current Outpatient Medications   Medication     acetaminophen (TYLENOL) 325 MG tablet     Ascorbic Acid (VITAMIN C PO)     blood glucose (NO BRAND SPECIFIED) test strip     blood glucose (NO BRAND SPECIFIED) test strip     blood glucose (ONETOUCH VERIO IQ) test strip     blood glucose monitoring (NO BRAND SPECIFIED) meter device kit     Blood Pressure Monitoring (BLOOD PRESSURE CUFF) MISC     cephalexin 500 MG tablet     Cholecalciferol (VITAMIN D-3) 125 MCG (5000 UT) TABS     clobetasol (TEMOVATE) 0.05 % external cream     Continuous Blood Gluc Sensor (DEXCOM G6 SENSOR) MISC     fluconazole (DIFLUCAN) 150 MG tablet     Injection Device for insulin (INPEN 100-GREY-JANET) TALI     Injection Device for insulin (INPEN 100-GREY-JANET) TALI     Insulin Aspart (NOVOLOG SC)     insulin pen needle (32G X 4 MM) 32G X 4 MM miscellaneous     NOVOLOG FLEXPEN 100 UNIT/ML soln     NOVOLOG PENFILL 100 UNIT/ML soln     NOVOLOG PENFILL 100 UNIT/ML soln     Prenatal Vit-Fe Fumarate-FA (TRINATE) TABS     blood glucose (NO BRAND SPECIFIED) test strip     glucagon (GLUCAGON EMERGENCY) 1 MG kit     No current facility-administered medications for this visit.        Family History:  Family History   Problem Relation Age of Onset     Gestational Diabetes Mother      Diabetes Type 2  Maternal Grandmother      Heart Disease Father      Hypertension Father      Glaucoma No family hx of      Macular Degeneration No family hx of        Social History:  Social History     Tobacco Use     Smoking status: Never Smoker     Smokeless tobacco: Never Used   Substance Use Topics      Alcohol use: No       ROS:  negative    Exam  There were no vitals taken for this virtual visit.  Gen: calm, nad, pleasant and conversant  Neuro: A&O    Labs/Imaging    ICA amparo <1:4 (on 2/14/19)  GAD65 amparo 72.1 (0.0-5.0)     ENDO DIABETES Latest Ref Rng & Units 10/11/2019   HEMOGLOBIN A1C 0 - 5.6 %     HEMOGLOBIN A1 95.0 - 97.9 %     HEMOGLOBIN A1C, POC 4.3 - 6 %     HGB 11.7 - 15.7 g/dL 12.1   GLUCOSE 70 - 99 mg/dL     CREATININE 0.50 - 1.00 mg/dL     POTASSIUM 3.4 - 5.3 mmol/L     ALT 0 - 50 U/L     AST 0 - 35 U/L     WBC 4.0 - 11.0 10e9/L 8.3   RBC 3.8 - 5.2 10e12/L 4.82   HGB 11.7 - 15.7 g/dL 12.1   HCT 35.0 - 47.0 % 38.5   MCV 78 - 100 fl 80   MCH 26.5 - 33.0 pg 25.1 (L)   MCHC 31.5 - 36.5 g/dL 31.4 (L)   RDW 10.0 - 15.0 % 16.6 (H)    - 450 10e9/L 440            Creatinine   Date Value Ref Range Status   10/08/2019 0.63 0.50 - 1.00 mg/dL Final           Lab Results   Component Value Date     A1C 6.0 04/05/2019     A1C 7.6 03/06/2019     A1C 8.8 02/18/2019     A1C 12.0 01/12/2019      Assessment and Plan     Type 1 DM, sub-optimal control, was honeymooning and was requiring no insulin, but now is not and her insulin needs went up,  She feels comfortable wearing her dexcom sensor regularly. She is willing to begin taking some meal time insulin and using a correction scale when necessary and will follow up with Rosibel again.     Labs: due for A1c    Patient Instructions:  Nice to talk with you today in virtual clinic.    Please go to any [place] lab FOR A1C. Follow standard safety precautions for COVID-19, practice social isolation, hand hygiene, do not touch your face, nose, or mouth, wear mask if have to go out in public to be respectful of community.    Start meal time insulin 1 unit per 30 grams of carbohydrates and a low intensity correction scale     Lab Results   Component Value Date    A1C 6.0 04/05/2019    A1C 7.6 03/06/2019    A1C 8.8 02/18/2019    A1C 12.0 01/12/2019       Please see NATANAEL Gleason,  Diabetes Education so you can get an insulin pump if you want one eventually     Please use the following correction scale:     LOW Insulin Resistance or Correction Factor of 1 for 100 mg/dL  Correction Scale    Do Not give Correction Insulin if Pre-Meal BG less than 140.   For Pre-Meal  - 239 give 1 unit.   For Pre-Meal  - 339 give 2 units.   For Pre-Meal BG greater than or equal to 340 give 3 units.   To be given with prandial insulin, and based on pre-meal blood glucose.   Notify provider if glucose greater than or equal to 350 mg/dL after administration of correction dose.     LOW Insulin Resistance or Correction Factor of 1 for 100 mg/dL  Do Not give Bedtime Correction Insulin if BG less than 200.   For  - 299 give 1 unit.   For  - 399 give 2 units   For BG greater than or equal 400 give 3 units.   Notify provider if glucose greater than or equal to 350 mg/dL after administration of correction dose.     Fasting blood sugar goals are <130  2 hours after eating blood sugar goals are: <180     For questions about blood sugars, please call [phone] anytime & ask the  to page diabetes on-call.    RTC 3 MONTHS    Best Wishes,  Dr. Peyton Correia MD, MPH  Endocrinologist    Time: 15 min spent on chart review, evaluation, management, counseling, education, & motivational interviewing with greater than 50% of the total time was spent on counseling and coordinating care

## 2020-07-13 ENCOUNTER — VIRTUAL VISIT (OUTPATIENT)
Dept: ENDOCRINOLOGY | Facility: CLINIC | Age: 20
End: 2020-07-13
Payer: COMMERCIAL

## 2020-07-13 DIAGNOSIS — E10.65 TYPE 1 DIABETES MELLITUS WITH HYPERGLYCEMIA (H): Primary | ICD-10-CM

## 2020-07-13 NOTE — LETTER
"7/13/2020       RE: Fabio Lacey  [address]  [facility] [address]     Dear Colleague,    Thank you for referring your patient, Fabio Lacey, to the [facility] ENDOCRINOLOGY at [facility]. Please see a copy of my visit note below.    Fabio Lacey is a 20 year old female who is being evaluated via a billable video visit.      The patient has been notified of following:     \"This video visit will be conducted via a call between you and your physician/provider. We have found that certain health care needs can be provided without the need for an in-person physical exam.  This service lets us provide the care you need with a video conversation.  If a prescription is necessary we can send it directly to your pharmacy.  If lab work is needed we can place an order for that and you can then stop by our lab to have the test done at a later time.    Video visits are billed at different rates depending on your insurance coverage.  Please reach out to your insurance provider with any questions.    If during the course of the call the physician/provider feels a video visit is not appropriate, you will not be charged for this service.\"    Patient has given verbal consent for Video visit? Yes  How would you like to obtain your AVS? Mail a copy  Patient would like the video invitation sent by: Send to e-mail at: [email or web address]  Will anyone else be joining your video visit? No    VISIT HAD TO BE CONVERTED TO PHONE VISIT DUE TO TECHNICAL ISSUES ON PATIENT'S END. HE DID NOT RESPOND TO VIDEO VISIT INVITE, SO PHYSICIAN CALLED PATIENT.    Fabio Lacey is a 20 year old female who is being evaluated via a billable telephone visit.      The patient has been notified of following:     \"This telephone visit will be conducted via a call between you and your physician/provider. We have found that certain health care needs can be provided without the need for a " "physical exam.  This service lets us provide the care you need with a short phone conversation.  If a prescription is necessary we can send it directly to your pharmacy.  If lab work is needed we can place an order for that and you can then stop by our lab to have the test done at a later time.    Telephone visits are billed at different rates depending on your insurance coverage. During this emergency period, for some insurers they may be billed the same as an in-person visit.  Please reach out to your insurance provider with any questions.    If during the course of the call the physician/provider feels a telephone visit is not appropriate, you will not be charged for this service.\"    Patient has given verbal consent for Telephone visit?  Yes    What phone number would you like to be contacted at? [phone]    How would you like to obtain your AVS? Bella    Phone call duration: 7 minutes 48 seconds    Reason for visit/consult: Type 1 DM, post-pregnancy     Primary care provider: Engelmann, Lauren Anneliese     HPI:  A1c was 5.1%  19 yo female with Type 1 DM, mostly controlled on diet alone, who I last saw 3/12/19. She has seen Soni Daniel on 10/14/19, see quote below:  \"Devin does understand that she has autoimmune type 1 diabetes and that she is likely honeymooning.  She understands that she did not have gestational diabetes and that it is very important to continue monitoring her glucose on the dexcom sensor.  She has a plan to take Novolog correction if her sugar goes over 175 mg/dL (1/75 over 175).  She has not required carb coverage or Lantus.  She has been wearing her dexcom G6 sensor with an overall average of 93 mg/dL the past week.  She has had no hypoglycemia, but reports she feels shaky if she does not eat.  She reports having very low appetite.  She is almost back to pre-pregnancy weight, which she is excited about. She feels good.  Her last a1c was 5.6%.\"     Devin has delivered a baby girl on " 10/3/19 named Lavinia. Post-delivery she required no insulin. She is breastfeeding exclusively. She is not currently taking anything for her diabetes. She is feeling fine without any complaints.     She is having some hyperglycemia. No objective glucose data download is available at time of visit.    She saw Rosibel in [facility] in the past and benefited greatly from this.       Past Medical/Surgical History:  Past Medical History:   Diagnosis Date     Type I diabetes mellitus (H)     Recently Dx: Jan 2019     Past Surgical History:   Procedure Laterality Date     NO HISTORY OF SURGERY         Allergies:  No Known Allergies    Current Medications   Current Outpatient Medications   Medication     acetaminophen (TYLENOL) 325 MG tablet     Ascorbic Acid (VITAMIN C PO)     blood glucose (NO BRAND SPECIFIED) test strip     blood glucose (NO BRAND SPECIFIED) test strip     blood glucose (ONETOUCH VERIO IQ) test strip     blood glucose monitoring (NO BRAND SPECIFIED) meter device kit     Blood Pressure Monitoring (BLOOD PRESSURE CUFF) MISC     cephalexin 500 MG tablet     Cholecalciferol (VITAMIN D-3) 125 MCG (5000 UT) TABS     clobetasol (TEMOVATE) 0.05 % external cream     Continuous Blood Gluc Sensor (DEXCOM G6 SENSOR) MISC     fluconazole (DIFLUCAN) 150 MG tablet     Injection Device for insulin (INPEN 100-GREY-JANET) TALI     Injection Device for insulin (INPEN 100-GREY-JANET) TALI     Insulin Aspart (NOVOLOG SC)     insulin pen needle (32G X 4 MM) 32G X 4 MM miscellaneous     NOVOLOG FLEXPEN 100 UNIT/ML soln     NOVOLOG PENFILL 100 UNIT/ML soln     NOVOLOG PENFILL 100 UNIT/ML soln     Prenatal Vit-Fe Fumarate-FA (TRINATE) TABS     blood glucose (NO BRAND SPECIFIED) test strip     glucagon (GLUCAGON EMERGENCY) 1 MG kit     No current facility-administered medications for this visit.        Family History:  Family History   Problem Relation Age of Onset     Gestational Diabetes Mother      Diabetes Type 2  Maternal  Grandmother      Heart Disease Father      Hypertension Father      Glaucoma No family hx of      Macular Degeneration No family hx of        Social History:  Social History     Tobacco Use     Smoking status: Never Smoker     Smokeless tobacco: Never Used   Substance Use Topics     Alcohol use: No       ROS:  negative    Exam  There were no vitals taken for this virtual visit.  Gen: calm, nad, pleasant and conversant  Neuro: A&O    Labs/Imaging    ICA amparo <1:4 (on 2/14/19)  GAD65 amparo 72.1 (0.0-5.0)     ENDO DIABETES Latest Ref Rng & Units 10/11/2019   HEMOGLOBIN A1C 0 - 5.6 %     HEMOGLOBIN A1 95.0 - 97.9 %     HEMOGLOBIN A1C, POC 4.3 - 6 %     HGB 11.7 - 15.7 g/dL 12.1   GLUCOSE 70 - 99 mg/dL     CREATININE 0.50 - 1.00 mg/dL     POTASSIUM 3.4 - 5.3 mmol/L     ALT 0 - 50 U/L     AST 0 - 35 U/L     WBC 4.0 - 11.0 10e9/L 8.3   RBC 3.8 - 5.2 10e12/L 4.82   HGB 11.7 - 15.7 g/dL 12.1   HCT 35.0 - 47.0 % 38.5   MCV 78 - 100 fl 80   MCH 26.5 - 33.0 pg 25.1 (L)   MCHC 31.5 - 36.5 g/dL 31.4 (L)   RDW 10.0 - 15.0 % 16.6 (H)    - 450 10e9/L 440            Creatinine   Date Value Ref Range Status   10/08/2019 0.63 0.50 - 1.00 mg/dL Final           Lab Results   Component Value Date     A1C 6.0 04/05/2019     A1C 7.6 03/06/2019     A1C 8.8 02/18/2019     A1C 12.0 01/12/2019      Assessment and Plan     Type 1 DM, sub-optimal control, was honeymooning and was requiring no insulin, but now is not and her insulin needs went up,  She feels comfortable wearing her dexcom sensor regularly. She is willing to begin taking some meal time insulin and using a correction scale when necessary and will follow up with Rosibel again.     Labs: due for A1c    Patient Instructions:  Nice to talk with you today in virtual clinic.    Please go to any [facility] lab FOR A1C. Follow standard safety precautions for COVID-19, practice social isolation, hand hygiene, do not touch your face, nose, or mouth, wear mask if have to go out in public to  be respectful of community.    Start meal time insulin 1 unit per 30 grams of carbohydrates and a low intensity correction scale     Lab Results   Component Value Date    A1C 6.0 04/05/2019    A1C 7.6 03/06/2019    A1C 8.8 02/18/2019    A1C 12.0 01/12/2019       Please see NATANAEL Gleason, Diabetes Education so you can get an insulin pump if you want one eventually     Please use the following correction scale:     LOW Insulin Resistance or Correction Factor of 1 for 100 mg/dL  Correction Scale    Do Not give Correction Insulin if Pre-Meal BG less than 140.   For Pre-Meal  - 239 give 1 unit.   For Pre-Meal  - 339 give 2 units.   For Pre-Meal BG greater than or equal to 340 give 3 units.   To be given with prandial insulin, and based on pre-meal blood glucose.   Notify provider if glucose greater than or equal to 350 mg/dL after administration of correction dose.     LOW Insulin Resistance or Correction Factor of 1 for 100 mg/dL  Do Not give Bedtime Correction Insulin if BG less than 200.   For  - 299 give 1 unit.   For  - 399 give 2 units   For BG greater than or equal 400 give 3 units.   Notify provider if glucose greater than or equal to 350 mg/dL after administration of correction dose.     Fasting blood sugar goals are <130  2 hours after eating blood sugar goals are: <180     For questions about blood sugars, please call [phone] anytime & ask the  to page diabetes on-call.    RTC 3 MONTHS    Best Wishes,  Dr. Peyton Correia MD, MPH  Endocrinologist    Time: 15 min spent on chart review, evaluation, management, counseling, education, & motivational interviewing with greater than 50% of the total time was spent on counseling and coordinating care

## 2020-07-13 NOTE — PATIENT INSTRUCTIONS
Nice to talk with you today in virtual clinic.    Please go to any [facility] lab FOR A1C. Follow standard safety precautions for COVID-19, practice social isolation, hand hygiene, do not touch your face, nose, or mouth, wear mask if have to go out in public to be respectful of community.    Start meal time insulin 1 unit per 30 grams of carbohydrates and a low intensity correction scale     Lab Results   Component Value Date    A1C 6.0 04/05/2019    A1C 7.6 03/06/2019    A1C 8.8 02/18/2019    A1C 12.0 01/12/2019       Please see NATANAEL Gleason, Diabetes Education so you can get an insulin pump if you want one eventually     Please use the following correction scale:     LOW Insulin Resistance or Correction Factor of 1 for 100 mg/dL  Correction Scale    Do Not give Correction Insulin if Pre-Meal BG less than 140.   For Pre-Meal  - 239 give 1 unit.   For Pre-Meal  - 339 give 2 units.   For Pre-Meal BG greater than or equal to 340 give 3 units.   To be given with prandial insulin, and based on pre-meal blood glucose.   Notify provider if glucose greater than or equal to 350 mg/dL after administration of correction dose.     LOW Insulin Resistance or Correction Factor of 1 for 100 mg/dL  Do Not give Bedtime Correction Insulin if BG less than 200.   For  - 299 give 1 unit.   For  - 399 give 2 units   For BG greater than or equal 400 give 3 units.   Notify provider if glucose greater than or equal to 350 mg/dL after administration of correction dose.     Fasting blood sugar goals are <130  2 hours after eating blood sugar goals are: <180     For questions about blood sugars, please call [phone] anytime & ask the  to page diabetes on-call.    RTC 3 MONTHS    Best Wishes,  Dr. Peyton Correia MD, MPH  Endocrinologist

## 2020-07-22 ENCOUNTER — MYC MEDICAL ADVICE (OUTPATIENT)
Dept: ENDOCRINOLOGY | Facility: CLINIC | Age: 20
End: 2020-07-22

## 2020-08-07 ENCOUNTER — TELEPHONE (OUTPATIENT)
Dept: ENDOCRINOLOGY | Facility: CLINIC | Age: 20
End: 2020-08-07

## 2020-08-07 DIAGNOSIS — E10.65 TYPE 1 DIABETES MELLITUS WITH HYPERGLYCEMIA (H): Primary | ICD-10-CM

## 2020-08-07 RX ORDER — PROCHLORPERAZINE 25 MG/1
SUPPOSITORY RECTAL
Qty: 3 EACH | Refills: 11 | Status: SHIPPED | OUTPATIENT
Start: 2020-08-07 | End: 2020-12-04

## 2020-08-07 RX ORDER — PROCHLORPERAZINE 25 MG/1
SUPPOSITORY RECTAL
Qty: 1 EACH | Refills: 3 | Status: SHIPPED | OUTPATIENT
Start: 2020-08-07 | End: 2020-08-27

## 2020-08-08 NOTE — TELEPHONE ENCOUNTER
Received a page regarding call from the patient:    The patient has history of type I diabetes, currently on short-acting insulin alone. Her insulin requirements are not high yet   Last hemoglobin A1c was checked in January this year, 5.1    The patient is concerned about her blood glucose readings. This morning her reading was around 260. Prior to calling [facility] her reading was around 360    she usually has a DEXCOM monitor, but was waiting for it to be replaced but has not received her shipment yet. She had not been checking her glucose readings with her monitor as well    this morning when she woke up she felt uneasy, thirsty and she says that she knew that her glucose readings were high    she currently takes short acting insulin, NovoLog with meals and has correction    had been seen in our clinic on 7/13/20    the patient says that she does not like to eat carbs so she is now going to eat food from [employer]    Plan:     Being a type I diabetic, the patient has high risk for developing DKA    Her insulin requirements might be increasing    We will start her small dose of insulin Lantus, 10 units at bedtime (prescription sent)    The patient will check her blood glucose readings frequently with the monitor until she receives her DEXCOM    We will arrange for a follow-up visit next week for her in endocrinology clinic. Will need reevaluation for insulin requirements    Case discussed with MD Taina Moore MD  Endocrinology Fellow  Pager number [phone]

## 2020-08-19 NOTE — TELEPHONE ENCOUNTER
CLINIC COORDINATOR SCHEDULING NOTES    CALL RESULT: LVM    APPT TYPE: VIRTUAL VISIT RETURN    PROVIDER: Fredy Correia    DATE APPT NEEDED: 1st available

## 2020-08-27 DIAGNOSIS — E10.9 WELL CONTROLLED TYPE 1 DIABETES MELLITUS (H): ICD-10-CM

## 2020-08-27 DIAGNOSIS — E10.65 TYPE 1 DIABETES MELLITUS WITH HYPERGLYCEMIA (H): ICD-10-CM

## 2020-08-27 DIAGNOSIS — E10.9 TYPE 1 DIABETES MELLITUS WITHOUT COMPLICATION (H): ICD-10-CM

## 2020-08-27 RX ORDER — PROCHLORPERAZINE 25 MG/1
SUPPOSITORY RECTAL
Qty: 1 EACH | Refills: 3 | Status: SHIPPED | OUTPATIENT
Start: 2020-08-27 | End: 2020-12-04

## 2020-08-27 RX ORDER — INSULIN ASPART 100 [IU]/ML
INJECTION, SOLUTION INTRAVENOUS; SUBCUTANEOUS
Qty: 15 ML | Refills: 3 | Status: SHIPPED | OUTPATIENT
Start: 2020-08-27 | End: 2022-03-21

## 2020-08-27 RX ORDER — PROCHLORPERAZINE 25 MG/1
1 SUPPOSITORY RECTAL
Qty: 3 EACH | Refills: 11 | Status: SHIPPED | OUTPATIENT
Start: 2020-08-27 | End: 2021-09-14

## 2020-10-01 ENCOUNTER — OFFICE VISIT (OUTPATIENT)
Dept: FAMILY MEDICINE | Facility: OTHER | Age: 20
End: 2020-10-01
Attending: FAMILY MEDICINE
Payer: COMMERCIAL

## 2020-10-01 ENCOUNTER — NURSE TRIAGE (OUTPATIENT)
Dept: FAMILY MEDICINE | Facility: OTHER | Age: 20
End: 2020-10-01

## 2020-10-01 DIAGNOSIS — Z20.822 COVID-19 RULED OUT: Primary | ICD-10-CM

## 2020-10-01 PROCEDURE — U0003 INFECTIOUS AGENT DETECTION BY NUCLEIC ACID (DNA OR RNA); SEVERE ACUTE RESPIRATORY SYNDROME CORONAVIRUS 2 (SARS-COV-2) (CORONAVIRUS DISEASE [COVID-19]), AMPLIFIED PROBE TECHNIQUE, MAKING USE OF HIGH THROUGHPUT TECHNOLOGIES AS DESCRIBED BY CMS-2020-01-R: HCPCS | Mod: ZL

## 2020-10-01 NOTE — TELEPHONE ENCOUNTER
"Pt updated that she needs to get supplies for her diabetes as she has no strips and Dexcom .Updated she needs to contact Endocrinologist today.Needs obtain BGL supplies.She states she will.She verbalized understanding of below.    Brittany Clemente RN      Discharge Instructions for COVID-19 Patients  You have--or may have--COVID-19. Please follow the instructions listed below.   If you have a weakened immune system, discuss with your doctor any other actions you need to take.  How can I protect others?  If you have symptoms (fever, cough, body aches or trouble breathing):    Stay home and away from others (self-isolate) until:  ? At least 10 days have passed since your symptoms started, And   ? You've had no fever--and no medicine that reduces fever--for 1 full day (24 hours), And    ? Your other symptoms have resolved (gotten better).  If you don't show symptoms, but testing showed that you have COVID-19:    Stay home and away from others (self-isolate). Follow the tips under \"How do I self-isolate?\" below for 10 days (20 days if you have a weak immune system).    You don't need to be retested for COVID-19 before going back to school or work. As long as you're fever-free and feeling better, you can go back to school, work and other activities after waiting the 10 or 20 days.   How do I self-isolate?    Stay in your own room, even for meals. Use your own bathroom if you can.    Stay away from others in your home. No hugging, kissing or shaking hands. No visitors.    Don't go to work, school or anywhere else.    Clean \"high touch\" surfaces often (doorknobs, counters, handles). Use household cleaning spray or wipes. You'll find a full list of  on the EPA website: www.epa.gov/pesticide-registration/list-n-disinfectants-use-against-sars-cov-2.    Cover your mouth and nose with a mask or other face covering to avoid spreading germs.    Wash your hands and face often. Use soap and water.    Caregivers in these " groups are at risk for severe illness due to COVID-19:  ? People 65 years and older  ? People who live in a nursing home or long-term care facility  ? People with chronic disease (lung, heart, cancer, diabetes, kidney, liver, immunologic)  ? People who have a weakened immune system, including those who:    Are in cancer treatment    Take medicine that weakens the immune system, such as corticosteroids    Had a bone marrow or organ transplant    Have an immune deficiency    Have poorly controlled HIV or AIDS    Are obese (body mass index of 40 or higher)    Smoke regularly    Caregivers should wear gloves while washing dishes, handling laundry and cleaning bedrooms and bathrooms.    Use caution when washing and drying laundry: Don't shake dirty laundry and use the warmest water setting that you can.    For more tips on managing your health at home, go to www.cdc.gov/coronavirus/2019-ncov/downloads/10Things.pdf.  How can I take care of myself at home?  1. Get lots of rest. Drink extra fluids (unless a doctor has told you not to).    2. Take Tylenol (acetaminophen) for fever or pain. If you have liver or kidney problems, ask your family doctor if it's okay to take Tylenol.     Adults can take either:  ? 650 mg (two 325 mg pills) every 4 to 6 hours, or   ? 1,000 mg (two 500 mg pills) every 8 hours as needed.  ? Note: Don't take more than 3,000 mg in one day. Acetaminophen is found in many medicines (both prescribed and over-the-counter medicines). Read all labels to be sure you don't take too much.   For children, check the Tylenol bottle for the right dose. The dose is based on the child's age or weight.  3. If you have other health problems (like cancer, heart failure, an organ transplant or severe kidney disease): Call your specialty clinic if you don't feel better in the next 2 days.    4. Know when to call 911. Emergency warning signs include:  ? Trouble breathing or shortness of breath  ? Pain or pressure in the  chest that doesn't go away  ? Feeling confused like you haven't felt before, or not being able to wake up  ? Bluish-colored lips or face    5. Your doctor may have prescribed a blood thinner medicine. Follow their instructions.  Where can I get more information?    [facility] - About COVID-19: [email or web address]    CDC - What to Do If You're Sick: www.cdc.gov/coronavirus/2019-ncov/about/steps-when-sick.html    CDC - Ending Home Isolation: www.cdc.gov/coronavirus/2019-ncov/hcp/disposition-in-home-patients.html    [facility] - Caring for Someone: www.cdc.gov/coronavirus/2019-ncov/if-you-are-sick/care-for-someone.html    [facility] - Interim Guidance for Hospital Discharge to Home: www.[facility].[place]/diseases/coronavirus/hcp/hospdischarge.pdf    [facility] clinical trials (COVID-19 research studies): clinicalaffairs.[facility]-clinical-trials    Below are the COVID-19 hotlines at the [place] Department of Health ([facility]). Interpreters are available.  ? For health questions: Call 041-115-7140 or 1-120.951.3684 (7 a.m. to 7 p.m.)  ? For questions about schools and childcare: Call 876-090-5039 or 1-214.232.7078 (7 a.m. to 7 p.m.)    For informational purposes only. Not to replace the advice of your health care provider. Clinically reviewed by the Infection Prevention Team. Copyright   2020 [facility]. All rights reserved. [employer] 450657 - REV 08/04/20.      Instructions for Patients  It is recommended that you have a test for coronavirus (COVID-19). This illness can cause fever, cough and trouble breathing. Many people get a mild case and get better on their own. Some people can get very sick.     Please follow these steps:    1. We will call to schedule your test.  2. A member of our care team will ask you some questions. Then, they will use a swab to collect samples from your nose and throat.     Our testing team will send you your test results.    How can I protect others?    Stay  home and away from others (self-isolate) until:    You ve had no fever--and no medicine that reduces fever--for 1 full day (24 hours). And      Your other symptoms have resolved (gotten better). For example, your cough or breathing has improved. And     At least 10 days have passed since your symptoms started.    Stay at least 6 feet away from others. (If someone will drive you to your test, stay in the backseat, as far away from the  as you can.)     Don t go to work, school or anywhere else. When it s time for your test, go straight to the testing site. Don t make any stops on the way there or back.     Wash your hands and face often. Use soap and water.     Cover your mouth and nose with a mask, tissue or washcloth.     Don t touch anyone. No hugging, kissing or handshakes.    How can I take care of myself?    1. Get lots of rest. Drink extra fluids (unless a doctor has told you not to).     2. Take Tylenol (acetaminophen) for fever or pain. If you have liver or kidney problems, ask your family doctor if it's okay to take Tylenol.     Adults can take either:     650 mg (two 325 mg pills) every 4 to 6 hours, or     1,000 mg (two 500 mg pills) every 8 hours as needed.     Note: Don't take more than 3,000 mg in one day.   Acetaminophen is found in many medicines (both prescribed and over-the-counter medicines). Read all labels to be sure you don't take too much.   For children, check the Tylenol bottle for the right dose. The dose is based on  the child's age or weight.    3. If you have other health problems (like cancer, heart failure, an organ transplant or severe kidney disease): Call your specialty clinic if you don't feel better in the next 2 days.    4. Know when to call 911: If your breathing is so bad that it keeps you from doing normal activities, call 911 or go to the emergency room. Tell them that you've been staying home and may have COVID-19.      Thank you for taking steps to prevent the spread  of this virus.  o Limit your contact with others.  o Wear a simple mask to cover your cough.  o Wash your hands well and often.  o If you need medical care, go to [email or web address] or contact your health care provider.     For more about COVID-19 and caring for yourself at home, visit the CDC website at https://www.cdc.gov/coronavirus/2019-ncov/about/steps-when-sick.html.     To learn about care at [facility], please go to [email or web address].     [facility] clinical trials (COVID-19 research studies): clinicalaffairs.[facility]-clinical-trials.    Below are the COVID-19 hotlines at the [place] Department of Health ([facility]). Interpreters are available.     For health questions: Call 648-840-6163 or 1-563.205.1574 (7 a.m. to 7 p.m.)    For questions about schools and childcare: Call 405-915-9481 or 1-367.747.8168 (7 a.m. to 7 p.m.)        COVID 19 Nurse Triage Plan/Patient Instructions    Please be aware that novel coronavirus (COVID-19) may be circulating in the community. If you develop symptoms such as fever, cough, or SOB or if you have concerns about the presence of another infection including coronavirus (COVID-19), please contact your health care provider or visit [email or web address].     Disposition/Instructions    Home care recommended. Follow home care protocol based instructions.    Thank you for taking steps to prevent the spread of this virus.  o Limit your contact with others.  o Wear a simple mask to cover your cough.  o Wash your hands well and often.    Resources    [facility]: About COVID-19: [email or web address]    CDC: What to Do If You're Sick: www.cdc.gov/coronavirus/2019-ncov/about/steps-when-sick.html    CDC: Ending Home Isolation: www.cdc.gov/coronavirus/2019-ncov/hcp/disposition-in-home-patients.html     CDC: Caring for Someone: www.cdc.gov/coronavirus/2019-ncov/if-you-are-sick/care-for-someone.html     [facility]: Interim Guidance for  Hospital Discharge to Home: www.health.[facility].[place]/diseases/coronavirus/hcp/hospdischarge.pdf    [facility] clinical trials (COVID-19 research studies): clinicalaffairs.[facility]/n-clinical-trials     Below are the COVID-19 hotlines at the [place] Department of Health ([facility]). Interpreters are available.   o For health questions: Call 975-789-6150 or 1-990.554.4464 (7 a.m. to 7 p.m.)  o For questions about schools and childcare: Call 171-440-8925 or 1-130.690.8055 (7 a.m. to 7 p.m.)                   Reason for Disposition    [1] COVID-19 infection suspected by caller or triager AND [2] mild symptoms (cough, fever, or others) AND [3] no complications or SOB    Additional Information    Negative: SEVERE difficulty breathing (e.g., struggling for each breath, speaks in single words)    Negative: Difficult to awaken or acting confused (e.g., disoriented, slurred speech)    Negative: Bluish (or gray) lips or face now    Negative: Shock suspected (e.g., cold/pale/clammy skin, too weak to stand, low BP, rapid pulse)    Negative: Sounds like a life-threatening emergency to the triager    Negative: [1] COVID-19 exposure AND [2] no symptoms    Negative: COVID-19 and Breastfeeding, questions about    Negative: [1] Adult with possible COVID-19 symptoms AND [2] triager concerned about severity of symptoms or other causes    Negative: SEVERE or constant chest pain or pressure (Exception: mild central chest pain, present only when coughing)    Negative: MODERATE difficulty breathing (e.g., speaks in phrases, SOB even at rest, pulse 100-120)    Negative: Patient sounds very sick or weak to the triager    Negative: MILD difficulty breathing (e.g., minimal/no SOB at rest, SOB with walking, pulse <100)    Negative: Chest pain or pressure    Negative: Fever > 103 F (39.4 C)    Negative: [1] Fever > 101 F (38.3 C) AND [2] age > 60    Negative: [1] Fever > 100.0 F (37.8 C) AND [2] bedridden (e.g., nursing home patient, CVA,  "chronic illness, recovering from surgery)    Negative: HIGH RISK patient (e.g., age > 64 years, diabetes, heart or lung disease, weak immune system) (Exception: Has already been evaluated by healthcare provider and has no new or worsening symptoms)    Negative: Fever present > 3 days (72 hours)    Negative: [1] Fever returns after gone for over 24 hours AND [2] symptoms worse or not improved    Negative: [1] Continuous (nonstop) coughing interferes with work or school AND [2] no improvement using cough treatment per protocol    Answer Assessment - Initial Assessment Questions  1. COVID-19 DIAGNOSIS: \"Who made your Coronavirus (COVID-19) diagnosis?\" \"Was it confirmed by a positive lab test?\" If not diagnosed by a HCP, ask \"Are there lots of cases (community spread) where you live?\" (See public health department website, if unsure)        2. ONSET: \"When did the COVID-19 symptoms start?\"       Yesterday evening  3. WORST SYMPTOM: \"What is your worst symptom?\" (e.g., cough, fever, shortness of breath, muscle aches)      Body aches, diarrhea  4. COUGH: \"Do you have a cough?\" If so, ask: \"How bad is the cough?\"        Yes not bad  5. FEVER: \"Do you have a fever?\" If so, ask: \"What is your temperature, how was it measured, and when did it start?\"      no  6. RESPIRATORY STATUS: \"Describe your breathing?\" (e.g., shortness of breath, wheezing, unable to speak)      No wheezing,no SOB  7. BETTER-SAME-WORSE: \"Are you getting better, staying the same or getting worse compared to yesterday?\"  If getting worse, ask, \"In what way?\"      worse  8. HIGH RISK DISEASE: \"Do you have any chronic medical problems?\" (e.g., asthma, heart or lung disease, weak immune system, etc.)     No-i DM type 1  9. PREGNANCY: \"Is there any chance you are pregnant?\" \"When was your last menstrual period?\"      no  10. OTHER SYMPTOMS: \"Do you have any other symptoms?\"  (e.g., chills, fatigue, headache, loss of smell or taste, muscle pain, sore throat)    "    Getting hot and then cold,HA, really fatigued,sore throat,muscle pain,lost smell    Protocols used: CORONAVIRUS (COVID-19) DIAGNOSED OR [id number] 8.4.20

## 2020-10-03 LAB
SARS-COV-2 RNA SPEC QL NAA+PROBE: NOT DETECTED
SPECIMEN SOURCE: NORMAL

## 2020-10-05 ENCOUNTER — ALLIED HEALTH/NURSE VISIT (OUTPATIENT)
Dept: ALLERGY | Facility: OTHER | Age: 20
End: 2020-10-05
Payer: COMMERCIAL

## 2020-10-05 DIAGNOSIS — Z23 NEED FOR PROPHYLACTIC VACCINATION AND INOCULATION AGAINST INFLUENZA: Primary | ICD-10-CM

## 2020-10-05 DIAGNOSIS — Z11.1 SCREENING EXAMINATION FOR PULMONARY TUBERCULOSIS: ICD-10-CM

## 2020-10-05 LAB
PPDINDURATION: 0 MM (ref 0–5)
PPDREDNESS: 0 MM

## 2020-10-05 PROCEDURE — 90471 IMMUNIZATION ADMIN: CPT

## 2020-10-05 PROCEDURE — 86580 TB INTRADERMAL TEST: CPT

## 2020-10-05 PROCEDURE — G0008 ADMIN INFLUENZA VIRUS VAC: HCPCS

## 2020-10-05 PROCEDURE — 90686 IIV4 VACC NO PRSV 0.5 ML IM: CPT

## 2020-10-05 ASSESSMENT — JOINT PAIN
TOTAL NUMBER OF TENDER JOINTS: 0
TOTAL NUMBER OF SWOLLEN JOINTS: 0

## 2020-10-06 DIAGNOSIS — E10.65 TYPE 1 DIABETES MELLITUS WITH HYPERGLYCEMIA (H): ICD-10-CM

## 2020-10-06 NOTE — TELEPHONE ENCOUNTER
blood glucose (NO BRAND SPECIFIED) test strip   Last Written Prescription Date:  8/22/19  Last Fill Quantity: 250,   # refills: 11  Last Virtual Visit : 7/13/20 with 3 month follow up recommended  Future Office visit:  None scheduled    Routing refill request to provider for review/approval because:  Tests 7 times per day in addition to Dexcom sensor?  Test strips on med list x 4

## 2020-10-07 ENCOUNTER — HOSPITAL ENCOUNTER (EMERGENCY)
Facility: HOSPITAL | Age: 20
End: 2020-10-07
Payer: COMMERCIAL

## 2020-11-28 ENCOUNTER — TELEPHONE (OUTPATIENT)
Dept: ENDOCRINOLOGY | Facility: CLINIC | Age: 20
End: 2020-11-28

## 2020-11-28 DIAGNOSIS — E10.9 WELL CONTROLLED TYPE 1 DIABETES MELLITUS (H): ICD-10-CM

## 2020-11-28 DIAGNOSIS — E10.65 TYPE 1 DIABETES MELLITUS WITH HYPERGLYCEMIA (H): ICD-10-CM

## 2020-11-28 DIAGNOSIS — E10.9 TYPE 1 DIABETES MELLITUS WITHOUT COMPLICATION (H): ICD-10-CM

## 2020-11-28 RX ORDER — INSULIN ASPART 100 [IU]/ML
INJECTION, SOLUTION INTRAVENOUS; SUBCUTANEOUS
Qty: 15 ML | Refills: 3 | Status: SHIPPED | OUTPATIENT
Start: 2020-11-28 | End: 2022-03-21

## 2020-11-28 NOTE — TELEPHONE ENCOUNTER
Pt called for refill. She ran out of insulins  Refilled Aspart and insulin pen needles  Patient also request for Glargine  Glargine refilled      Susan Harris  PGY 4   Fellow  Division of Endocrinology

## 2020-12-01 NOTE — PROGRESS NOTES
"Feeling well. Still struggling with high and low blood sugars- tough to control. Overall average is 105, but includes high and lows.     O: /84   Pulse 82   Ht 1.651 m (5' 5\")   Wt 73.9 kg (162 lb 14.4 oz)   LMP 2019   BMI 27.11 kg/m    See flow    A/p: 18 yo  at 36 wks, HRP d/t DM.      1. PNC: utd. Neg GBS  2. DM. Difficult to control. Planned IOL at 37 wks, 10/1 at 9 AM>   " Admit to [facility]  s/p IR PICC placement   LP with  IT with MTX today, fu flow  Blincyto  28 mcg/day on day 43-70(11/30-12/24)  Monitor for neurotoxicity  IV hydration, strict I/O, daily weight, mouth care   Monitor tumor lysis labs daily    Follow CBC/lytes/replete/ transfuse prn  ECOG Score 0  Day 57 LP with IT chemo on 12/14  Tentative DC home on Thursday 12/3 with home care and Blincyto infusion Admitted for chemotherapy with Blinatumumab   s/p IR PICC placement   11/30 s/p LP with  IT with MTX follow up flow cytometry.   Blincyto  28 mcg/day on day 43-71(11/30-12/27)  Monitor for neurotoxicity  IV hydration, strict I/O, daily weight, mouth care   Monitor tumor lysis labs daily    Follow CBC/lytes/replete/ transfuse prn  ECOG Score 0  Day 57 LP with IT chemo on 12/14  Tentative DC home on Thursday 12/3 with home care and Blincyto infusion  Bone marrow biopsy on Day 81 of Course II consolidation.

## 2020-12-02 ENCOUNTER — TELEPHONE (OUTPATIENT)
Dept: OBGYN | Facility: CLINIC | Age: 20
End: 2020-12-02

## 2020-12-02 DIAGNOSIS — Z11.1 SCREENING EXAMINATION FOR PULMONARY TUBERCULOSIS: Primary | ICD-10-CM

## 2020-12-02 NOTE — TELEPHONE ENCOUNTER
Tried to reach Devin but received voicemail.  Left message that order will be placed per her request.    OK'd by Dr Wright.

## 2020-12-02 NOTE — TELEPHONE ENCOUNTER
----- Message from Nohemi Garay sent at 12/2/2020 10:56 AM CST -----  Regarding: order  Contact: [phone]  Pt is calling to get and order for a blood draw tuberculosis test for her employer, please call pt once complete and leaved a VM if she doesn't answer, thanks

## 2020-12-04 DIAGNOSIS — E10.65 TYPE 1 DIABETES MELLITUS WITH HYPERGLYCEMIA (H): ICD-10-CM

## 2020-12-04 DIAGNOSIS — E10.9 WELL CONTROLLED TYPE 1 DIABETES MELLITUS (H): Primary | ICD-10-CM

## 2020-12-04 RX ORDER — PROCHLORPERAZINE 25 MG/1
SUPPOSITORY RECTAL
Qty: 3 EACH | Refills: 11 | Status: SHIPPED | OUTPATIENT
Start: 2020-12-04 | End: 2023-06-18

## 2020-12-04 RX ORDER — PROCHLORPERAZINE 25 MG/1
SUPPOSITORY RECTAL
Qty: 1 EACH | Refills: 3 | Status: SHIPPED | OUTPATIENT
Start: 2020-12-04 | End: 2021-09-14

## 2020-12-04 NOTE — PROGRESS NOTES
Devin called for refills.  Asked her to schedule appt with me and have labs done prior to this.  Orders placed.   Soni Daniel PA-C

## 2021-01-03 ENCOUNTER — HEALTH MAINTENANCE LETTER (OUTPATIENT)
Age: 21
End: 2021-01-03

## 2021-01-05 DIAGNOSIS — Z11.1 SCREENING EXAMINATION FOR PULMONARY TUBERCULOSIS: ICD-10-CM

## 2021-01-05 DIAGNOSIS — E10.9 WELL CONTROLLED TYPE 1 DIABETES MELLITUS (H): ICD-10-CM

## 2021-01-05 LAB
ANION GAP SERPL CALCULATED.3IONS-SCNC: 6 MMOL/L (ref 3–14)
BUN SERPL-MCNC: 16 MG/DL (ref 7–30)
CALCIUM SERPL-MCNC: 8.8 MG/DL (ref 8.5–10.1)
CHLORIDE SERPL-SCNC: 107 MMOL/L (ref 94–109)
CHOLEST SERPL-MCNC: 181 MG/DL
CO2 SERPL-SCNC: 25 MMOL/L (ref 20–32)
CREAT SERPL-MCNC: 0.59 MG/DL (ref 0.52–1.04)
CREAT UR-MCNC: 102 MG/DL
EST. AVERAGE GLUCOSE BLD GHB EST-MCNC: 146 MG/DL
GFR SERPL CREATININE-BSD FRML MDRD: >90 ML/MIN/{1.73_M2}
GLUCOSE SERPL-MCNC: 127 MG/DL (ref 70–99)
HBA1C MFR BLD: 6.7 % (ref 0–5.6)
HDLC SERPL-MCNC: 51 MG/DL
LDLC SERPL CALC-MCNC: 116 MG/DL
MICROALBUMIN UR-MCNC: 9 MG/L
MICROALBUMIN/CREAT UR: 8.36 MG/G CR (ref 0–25)
NONHDLC SERPL-MCNC: 130 MG/DL
POTASSIUM SERPL-SCNC: 3.4 MMOL/L (ref 3.4–5.3)
SODIUM SERPL-SCNC: 138 MMOL/L (ref 133–144)
TRIGL SERPL-MCNC: 69 MG/DL
TSH SERPL DL<=0.005 MIU/L-ACNC: 2.49 MU/L (ref 0.4–4)

## 2021-01-05 PROCEDURE — 83036 HEMOGLOBIN GLYCOSYLATED A1C: CPT | Mod: ZL | Performed by: OBSTETRICS & GYNECOLOGY

## 2021-01-05 PROCEDURE — 80061 LIPID PANEL: CPT | Mod: ZL,59 | Performed by: OBSTETRICS & GYNECOLOGY

## 2021-01-05 PROCEDURE — 86481 TB AG RESPONSE T-CELL SUSP: CPT | Mod: ZL,59 | Performed by: OBSTETRICS & GYNECOLOGY

## 2021-01-05 PROCEDURE — 36415 COLL VENOUS BLD VENIPUNCTURE: CPT | Mod: ZL | Performed by: OBSTETRICS & GYNECOLOGY

## 2021-01-05 PROCEDURE — 82043 UR ALBUMIN QUANTITATIVE: CPT | Mod: ZL | Performed by: PHYSICIAN ASSISTANT

## 2021-01-05 PROCEDURE — 80048 BASIC METABOLIC PNL TOTAL CA: CPT | Mod: ZL,59 | Performed by: OBSTETRICS & GYNECOLOGY

## 2021-01-05 PROCEDURE — 84443 ASSAY THYROID STIM HORMONE: CPT | Mod: ZL,59 | Performed by: OBSTETRICS & GYNECOLOGY

## 2021-01-05 PROCEDURE — 999N001182 HC STATISTIC ESTIMATED AVERAGE GLUCOSE: Mod: ZL | Performed by: OBSTETRICS & GYNECOLOGY

## 2021-01-07 LAB
GAMMA INTERFERON BACKGROUND BLD IA-ACNC: 0.02 IU/ML
M TB IFN-G CD4+ BCKGRND COR BLD-ACNC: 9.98 IU/ML
M TB TUBERC IFN-G BLD QL: NEGATIVE
MITOGEN IGNF BCKGRD COR BLD-ACNC: 0.02 IU/ML
MITOGEN IGNF BCKGRD COR BLD-ACNC: 0.03 IU/ML

## 2021-03-25 ENCOUNTER — MYC MEDICAL ADVICE (OUTPATIENT)
Dept: ENDOCRINOLOGY | Facility: CLINIC | Age: 21
End: 2021-03-25

## 2021-03-25 DIAGNOSIS — E10.9 WELL CONTROLLED TYPE 1 DIABETES MELLITUS (H): Primary | ICD-10-CM

## 2021-04-25 ENCOUNTER — HEALTH MAINTENANCE LETTER (OUTPATIENT)
Age: 21
End: 2021-04-25

## 2021-04-30 DIAGNOSIS — E10.9 WELL CONTROLLED TYPE 1 DIABETES MELLITUS (H): ICD-10-CM

## 2021-04-30 LAB
ANION GAP SERPL CALCULATED.3IONS-SCNC: 6 MMOL/L (ref 3–14)
BUN SERPL-MCNC: 18 MG/DL (ref 7–30)
CALCIUM SERPL-MCNC: 8.5 MG/DL (ref 8.5–10.1)
CHLORIDE SERPL-SCNC: 109 MMOL/L (ref 94–109)
CHOLEST SERPL-MCNC: 155 MG/DL
CO2 SERPL-SCNC: 24 MMOL/L (ref 20–32)
CREAT SERPL-MCNC: 0.68 MG/DL (ref 0.52–1.04)
EST. AVERAGE GLUCOSE BLD GHB EST-MCNC: 128 MG/DL
GFR SERPL CREATININE-BSD FRML MDRD: >90 ML/MIN/{1.73_M2}
GLUCOSE SERPL-MCNC: 102 MG/DL (ref 70–99)
HBA1C MFR BLD: 6.1 % (ref 0–5.6)
HDLC SERPL-MCNC: 50 MG/DL
LDLC SERPL CALC-MCNC: 95 MG/DL
NONHDLC SERPL-MCNC: 105 MG/DL
POTASSIUM SERPL-SCNC: 3.8 MMOL/L (ref 3.4–5.3)
SODIUM SERPL-SCNC: 139 MMOL/L (ref 133–144)
TRIGL SERPL-MCNC: 51 MG/DL

## 2021-04-30 PROCEDURE — 999N001182 HC STATISTIC ESTIMATED AVERAGE GLUCOSE: Mod: ZL | Performed by: PHYSICIAN ASSISTANT

## 2021-04-30 PROCEDURE — 36416 COLLJ CAPILLARY BLOOD SPEC: CPT | Mod: ZL | Performed by: PHYSICIAN ASSISTANT

## 2021-04-30 PROCEDURE — 80048 BASIC METABOLIC PNL TOTAL CA: CPT | Mod: ZL | Performed by: PHYSICIAN ASSISTANT

## 2021-04-30 PROCEDURE — 83036 HEMOGLOBIN GLYCOSYLATED A1C: CPT | Mod: ZL | Performed by: PHYSICIAN ASSISTANT

## 2021-04-30 PROCEDURE — 36415 COLL VENOUS BLD VENIPUNCTURE: CPT | Mod: ZL | Performed by: PHYSICIAN ASSISTANT

## 2021-04-30 PROCEDURE — 80061 LIPID PANEL: CPT | Mod: ZL | Performed by: PHYSICIAN ASSISTANT

## 2021-06-05 NOTE — LETTER
"7/3/2019       RE: Fabio Lacey  [address]  [place] [address]     Dear Colleague,    Thank you for referring your patient, Fabio Lacey, to the [facility] at [facility]. Please see a copy of my visit note below.    [facility]  Return OB Visit    S: Patient feels well today. Is worried about her blood pressure since it was elevated in the ED on 6/30 for neck pain. States her neck pain has improved with massage since then. Patient has started to feel movement of her fetus, was moving a lot today. Denies vaginal bleeding, LOF, contractions. Does have intermittent vaginal discharge, states her underwear is occasionally \"wet\" but no constant leaking. Reports good fetal movement. Is starting to have intermittent right sided low abdominal pain, feels sharp when it happens.     Patient follows with Endocrinology for her T1DM. She recently got a new glucose monitor that is reading her sugars higher than her old monitor. Changed her meal insulin to taking 1 unit per 5 carbs before every meal (not cleared w/ Endo yet), used to be 1 for 10 carbs. She is planning to send a message to [facility] and has an appointment set up for next Friday. Her monitor is currently reading her glucose level at 56 but patient states she feels fine. She has ophthamology apt set up for 3rd trimester. She is not taking baby aspirin because it made her bleed longer after insulin injections.    Pregnancy complicated by:   - 1 elevated BP at 23 weeks, HELLP and UPC normal   - Type I diabetes  - GERD  - Hep B NI - s/p 2/3 in series  - Rubella NI    O: /68 (BP Location: Left arm, Patient Position: Chair)   Pulse 98   Ht 1.676 m (5' 6\")   Wt 68 kg (150 lb)   LMP 01/08/2019   BMI 24.21 kg/m     Weight gain: 17 lbs, within nml limits     Recommended weight gain, leon:   BMI <18.5: 28 to 40 lbs  BMI 18.5 - 24.9: 25 to 35 lbs  BMI 25 - 29.9: " 15 to 25 lbs  BMI > 30: 11 to 20 lbs    Gen: Well-appearing, NAD    Fundal Height:  23 cm  FHR: 155-159 bpm     A/P:  Fabio Lacey is a 19 year old  at 24w0d by 7w1d US, here for return OB visit.    # T1DM  - New glucose monitor, patient adjusting insulin regimen on her own. Advised her to contact Endocrine ASAP to clear her adjustments. Next apt scheduled for 19.   - Blood glucose found to be 56 during appointment, patient given 5 oz can of apple juice  - BPP starting at 32 weeks. Serial growth US every 4 weeks, will schedule for next week.   - Encouraged patient to restart baby aspirin, refill provided  - Fetal echo nml on 19    # Single elevated BP:  - BP normal today   - HELLP labs, UPC normal on 19    # Routine prenatal care:  - Rh pos, Ab neg  - Treponema antibodies NR, HIV NR, Hep B non-immune, Rubella immune, Varicella non-immune  - Anatomy US nml, placenta posterior  - Genetic screen: 1st tri screen low-risk    Return to clinic in 1 weeks.     Staffed with Dr. Tyler Morrissey MD  Obstetrics and Gynecology, PGY-1  2019 , 9:15 PM      The Patient was seen in [facility] by Dr. Hernandez.   I reviewed the history & exam. Assessment and plan were jointly made.   Zoë Scott MD, MPH   No

## 2021-08-20 ENCOUNTER — MYC MEDICAL ADVICE (OUTPATIENT)
Dept: OBGYN | Facility: CLINIC | Age: 21
End: 2021-08-20

## 2021-08-20 ENCOUNTER — MYC MEDICAL ADVICE (OUTPATIENT)
Dept: ENDOCRINOLOGY | Facility: CLINIC | Age: 21
End: 2021-08-20

## 2021-08-20 DIAGNOSIS — Z11.1 SCREENING EXAMINATION FOR PULMONARY TUBERCULOSIS: Primary | ICD-10-CM

## 2021-08-20 DIAGNOSIS — E10.9 TYPE 1 DIABETES MELLITUS WITHOUT COMPLICATION (H): Primary | ICD-10-CM

## 2021-08-23 ENCOUNTER — APPOINTMENT (OUTPATIENT)
Dept: OCCUPATIONAL MEDICINE | Facility: OTHER | Age: 21
End: 2021-08-23

## 2021-08-25 ENCOUNTER — LAB (OUTPATIENT)
Dept: LAB | Facility: HOSPITAL | Age: 21
End: 2021-08-25
Payer: COMMERCIAL

## 2021-08-25 ENCOUNTER — APPOINTMENT (OUTPATIENT)
Dept: OCCUPATIONAL MEDICINE | Facility: OTHER | Age: 21
End: 2021-08-25

## 2021-08-25 DIAGNOSIS — E10.9 TYPE 1 DIABETES MELLITUS WITHOUT COMPLICATION (H): ICD-10-CM

## 2021-08-25 DIAGNOSIS — E10.9 WELL CONTROLLED TYPE 1 DIABETES MELLITUS (H): ICD-10-CM

## 2021-08-25 LAB
ALBUMIN SERPL-MCNC: 4.1 G/DL (ref 3.4–5)
ALP SERPL-CCNC: 80 U/L (ref 40–150)
ALT SERPL W P-5'-P-CCNC: 26 U/L (ref 0–50)
ANION GAP SERPL CALCULATED.3IONS-SCNC: 4 MMOL/L (ref 3–14)
AST SERPL W P-5'-P-CCNC: 20 U/L (ref 0–45)
BILIRUB SERPL-MCNC: 0.3 MG/DL (ref 0.2–1.3)
BUN SERPL-MCNC: 12 MG/DL (ref 7–30)
CALCIUM SERPL-MCNC: 9.2 MG/DL (ref 8.5–10.1)
CHLORIDE BLD-SCNC: 107 MMOL/L (ref 94–109)
CHOLEST SERPL-MCNC: 188 MG/DL
CO2 SERPL-SCNC: 27 MMOL/L (ref 20–32)
CREAT SERPL-MCNC: 0.71 MG/DL (ref 0.52–1.04)
EST. AVERAGE GLUCOSE BLD GHB EST-MCNC: 131 MG/DL
FASTING STATUS PATIENT QL REPORTED: YES
GFR SERPL CREATININE-BSD FRML MDRD: >90 ML/MIN/1.73M2
GLUCOSE BLD-MCNC: 99 MG/DL (ref 70–99)
HBA1C MFR BLD: 6.2 % (ref 0–5.6)
HDLC SERPL-MCNC: 50 MG/DL
LDLC SERPL CALC-MCNC: 98 MG/DL
NONHDLC SERPL-MCNC: 138 MG/DL
POTASSIUM BLD-SCNC: 3.9 MMOL/L (ref 3.4–5.3)
PROT SERPL-MCNC: 7.8 G/DL (ref 6.8–8.8)
SODIUM SERPL-SCNC: 138 MMOL/L (ref 133–144)
TRIGL SERPL-MCNC: 201 MG/DL

## 2021-08-25 PROCEDURE — 80061 LIPID PANEL: CPT

## 2021-08-25 PROCEDURE — 36415 COLL VENOUS BLD VENIPUNCTURE: CPT

## 2021-08-25 PROCEDURE — 83036 HEMOGLOBIN GLYCOSYLATED A1C: CPT

## 2021-08-25 PROCEDURE — 82040 ASSAY OF SERUM ALBUMIN: CPT

## 2021-08-25 PROCEDURE — 82247 BILIRUBIN TOTAL: CPT

## 2021-09-03 ENCOUNTER — APPOINTMENT (OUTPATIENT)
Dept: OCCUPATIONAL MEDICINE | Facility: OTHER | Age: 21
End: 2021-09-03

## 2021-09-13 DIAGNOSIS — E10.9 WELL CONTROLLED TYPE 1 DIABETES MELLITUS (H): ICD-10-CM

## 2021-09-13 DIAGNOSIS — E10.65 TYPE 1 DIABETES MELLITUS WITH HYPERGLYCEMIA (H): ICD-10-CM

## 2021-09-14 RX ORDER — PROCHLORPERAZINE 25 MG/1
SUPPOSITORY RECTAL
Qty: 1 EACH | Refills: 4 | Status: SHIPPED | OUTPATIENT
Start: 2021-09-14 | End: 2022-08-08

## 2021-09-14 RX ORDER — PROCHLORPERAZINE 25 MG/1
1 SUPPOSITORY RECTAL
Qty: 3 EACH | Refills: 11 | Status: SHIPPED | OUTPATIENT
Start: 2021-09-14 | End: 2022-08-08

## 2021-09-14 NOTE — TELEPHONE ENCOUNTER
Continuous Blood Gluc Transmit (DEXCOM G6 TRANSMITTER) MISC  Last Written Prescription Date:  12/4/2020  Last Fill Quantity: 1,   # refills: 3  Last Office Visit : 12/21/2020  Future Office visit:  None  Routing refill request to provider for review/approval because:  Drug not on the [facility], [facility] or [facility] refill protocol or controlled substance      Continuous Blood Gluc Sensor (DEXCOM G6 SENSOR) MISC  Last Written Prescription Date:  12/4/2020  Last Fill Quantity: 3,   # refills: 11  Last Office Visit : 12/21/2020  Future Office visit:  None  Routing refill request to provider for review/approval because:  Drug not on the [facility], [facility] or [facility] refill protocol or controlled substance    Estella Carrizales RN  Central Triage Red Flags/Med Refills

## 2021-10-07 ENCOUNTER — MYC MEDICAL ADVICE (OUTPATIENT)
Dept: ENDOCRINOLOGY | Facility: CLINIC | Age: 21
End: 2021-10-07

## 2021-10-07 DIAGNOSIS — E10.9 WELL CONTROLLED TYPE 1 DIABETES MELLITUS (H): Primary | ICD-10-CM

## 2021-10-10 ENCOUNTER — HEALTH MAINTENANCE LETTER (OUTPATIENT)
Age: 21
End: 2021-10-10

## 2021-10-18 NOTE — Clinical Note
Edwar Donohue,Could you get Devin in anytime soon?  She is a pregnant type 1.Thanks!Rosibel
Quality 431: Preventive Care And Screening: Unhealthy Alcohol Use - Screening: Patient not identified as an unhealthy alcohol user when screened for unhealthy alcohol use using a systematic screening method
Quality 226: Preventive Care And Screening: Tobacco Use: Screening And Cessation Intervention: Patient screened for tobacco use and is an ex/non-smoker
Quality 130: Documentation Of Current Medications In The Medical Record: Current Medications Documented
Detail Level: Detailed

## 2022-01-17 ENCOUNTER — MYC MEDICAL ADVICE (OUTPATIENT)
Dept: ENDOCRINOLOGY | Facility: CLINIC | Age: 22
End: 2022-01-17

## 2022-01-17 DIAGNOSIS — E10.9 TYPE 1 DIABETES MELLITUS WITHOUT COMPLICATION (H): Primary | ICD-10-CM

## 2022-01-18 ENCOUNTER — LAB REQUISITION (OUTPATIENT)
Dept: LAB | Facility: HOSPITAL | Age: 22
End: 2022-01-18
Payer: COMMERCIAL

## 2022-01-19 ENCOUNTER — LAB (OUTPATIENT)
Dept: OCCUPATIONAL MEDICINE | Facility: OTHER | Age: 22
End: 2022-01-19

## 2022-01-19 LAB
FLUAV RNA SPEC QL NAA+PROBE: NEGATIVE
FLUBV RNA RESP QL NAA+PROBE: NEGATIVE
RSV RNA SPEC NAA+PROBE: NEGATIVE
SARS-COV-2 RNA RESP QL NAA+PROBE: POSITIVE

## 2022-01-19 PROCEDURE — 87637 SARSCOV2&INF A&B&RSV AMP PRB: CPT | Performed by: FAMILY MEDICINE

## 2022-03-21 ENCOUNTER — APPOINTMENT (OUTPATIENT)
Dept: GENERAL RADIOLOGY | Facility: HOSPITAL | Age: 22
End: 2022-03-21
Attending: STUDENT IN AN ORGANIZED HEALTH CARE EDUCATION/TRAINING PROGRAM
Payer: COMMERCIAL

## 2022-03-21 ENCOUNTER — HOSPITAL ENCOUNTER (EMERGENCY)
Facility: HOSPITAL | Age: 22
Discharge: HOME | End: 2022-03-21
Attending: STUDENT IN AN ORGANIZED HEALTH CARE EDUCATION/TRAINING PROGRAM | Admitting: STUDENT IN AN ORGANIZED HEALTH CARE EDUCATION/TRAINING PROGRAM
Payer: COMMERCIAL

## 2022-03-21 VITALS
RESPIRATION RATE: 18 BRPM | DIASTOLIC BLOOD PRESSURE: 62 MMHG | HEART RATE: 76 BPM | OXYGEN SATURATION: 100 % | TEMPERATURE: 98.2 F | SYSTOLIC BLOOD PRESSURE: 118 MMHG

## 2022-03-21 DIAGNOSIS — R07.9 CHEST PAIN, UNSPECIFIED TYPE: ICD-10-CM

## 2022-03-21 PROCEDURE — 93005 ELECTROCARDIOGRAM TRACING: CPT

## 2022-03-21 PROCEDURE — 99284 EMERGENCY DEPT VISIT MOD MDM: CPT | Mod: 25

## 2022-03-21 PROCEDURE — 99284 EMERGENCY DEPT VISIT MOD MDM: CPT | Performed by: STUDENT IN AN ORGANIZED HEALTH CARE EDUCATION/TRAINING PROGRAM

## 2022-03-21 PROCEDURE — 71046 X-RAY EXAM CHEST 2 VIEWS: CPT

## 2022-03-21 PROCEDURE — 93010 ELECTROCARDIOGRAM REPORT: CPT | Performed by: INTERNAL MEDICINE

## 2022-03-21 RX ORDER — DEXTROAMPHETAMINE SACCHARATE, AMPHETAMINE ASPARTATE, DEXTROAMPHETAMINE SULFATE AND AMPHETAMINE SULFATE 1.25; 1.25; 1.25; 1.25 MG/1; MG/1; MG/1; MG/1
5 TABLET ORAL 2 TIMES DAILY PRN
COMMUNITY
End: 2023-06-18

## 2022-03-21 NOTE — DISCHARGE INSTRUCTIONS
- Please take Tylenol for your symptoms  - Please return to the Emergency Room if you do not improve, feel worse, or have any new or concerning symptoms. We would especially want to see you back if you experience worsening pain or difficulty breathing  - Please follow up with a primary care physician in 2-3 days to consider any further testing

## 2022-03-21 NOTE — ED TRIAGE NOTES
Pt states at 2300 yesterday she started having left chest pain radiating to left neck and left arm. Denies any recent illness. Pt states pain comes and goes.

## 2022-03-21 NOTE — ED PROVIDER NOTES
History     Chief Complaint   Patient presents with     Chest Pain     HPI  Fabio Lacey is a 21 year old female with a past medical history of diabetes mellitus type 1.  However, she notes that she does not always take insulin and that her diabetes is somewhat diet-controlled.  She does have a monitor and her current glucose is 73.  She has not taken insulin today.  Last night she was chatting with her parents when she developed some left-sided chest pain.  The chest pain also radiates to the neck and arm.  She has no personal history and no family history of early cardiac disease.  Chest pain is not worse with walking.  The pain has come and gone.  No nausea/vomiting/diarrhea.  No cough or fever.  No shortness of breath.  No pleuritic pain.  She denies the possibility of pregnancy and states her last period was within the past 1 to 2 weeks. No hx of liver failure or CHF. No leg swelling. No hx of PE. No recent travel/surgery.    Allergies:  No Known Allergies    Problem List:    Patient Active Problem List    Diagnosis Date Noted     Preeclampsia 10/07/2019     Priority: Medium      (normal spontaneous vaginal delivery) 10/03/2019     Priority: Medium     Pregnancy, supervision, high-risk 10/01/2019     Priority: Medium      contractions 2019     Priority: Medium     Diabetes mellitus affecting pregnancy in second trimester 07/10/2019     Priority: Medium     Encounter for triage in pregnant patient 2019     Priority: Medium     Nausea and vomiting 2019     Priority: Medium     Type 1 diabetes mellitus during pregnancy, first trimester 04/15/2019     Priority: Medium     Not immune to hepatitis B virus - offer vaccine series 2019     Priority: Medium     Maternal varicella, non-immune - offer PP booster 2019     Priority: Medium     High risk pregnancy, antepartum -LISA MCCRAY 2019     Priority: Medium     2019 - Normal Fetal Echo. MFM US No previa.  Serial US  for growth every 4 weeks. BPP to start at 32 weeks.  Had normal fetal echo. Mychart   8/30/19- BPP 8/8, AFV wnl       Hypoglycemia 02/17/2019     Priority: Medium     Type 1 diabetes mellitus without complication (H) 01/13/2019     Priority: Medium     3/27/2019 - Needs to start baby ASA >12 weeks  9/27/19- BPP 8/8, CHANTELLE wnl       Vitamin D deficiency 05/22/2018     Priority: Medium        Past Medical History:    Past Medical History:   Diagnosis Date     Type I diabetes mellitus (H)        Past Surgical History:    Past Surgical History:   Procedure Laterality Date     NO HISTORY OF SURGERY         Family History:    Family History   Problem Relation Age of Onset     Gestational Diabetes Mother      Diabetes Type 2  Maternal Grandmother      Heart Disease Father      Hypertension Father      Glaucoma No family hx of      Macular Degeneration No family hx of        Social History:  Marital Status:  Single [1]  Social History     Tobacco Use     Smoking status: Never Smoker     Smokeless tobacco: Never Used   Substance Use Topics     Alcohol use: No     Drug use: No        Medications:    acetaminophen (TYLENOL) 325 MG tablet  amphetamine-dextroamphetamine (ADDERALL) 5 MG tablet  Ascorbic Acid (VITAMIN C PO)  blood glucose monitoring (NO BRAND SPECIFIED) meter device kit  Cholecalciferol (VITAMIN D-3) 125 MCG (5000 UT) TABS  Continuous Blood Gluc Sensor (DEXCOM G6 SENSOR) MISC  Continuous Blood Gluc Transmit (DEXCOM G6 TRANSMITTER) MISC  Cyanocobalamin (VITAMIN B12 PO)  FOLIC ACID PO  glucagon (GLUCAGON EMERGENCY) 1 MG kit  Injection Device for insulin (INPEN 100-GREY-JANET) TALI  NOVOLOG PENFILL 100 UNIT/ML soln  blood glucose (NO BRAND SPECIFIED) test strip  blood glucose (NO BRAND SPECIFIED) test strip  blood glucose (ONETOUCH VERIO IQ) test strip  Blood Pressure Monitoring (BLOOD PRESSURE CUFF) MISC  Continuous Blood Gluc Sensor (DEXCOM G6 SENSOR) MISC  Injection Device for insulin (INPEN 100-GREY-JANET)  TALI  insulin glargine (LANTUS PEN) 100 UNIT/ML pen  insulin pen needle (32G X 4 MM) 32G X 4 MM miscellaneous          Review of Systems   All other systems reviewed and are negative.      Physical Exam   BP: 115/70  Pulse: 88  Temp: 98  F (36.7  C)  Resp: 16  SpO2: 100 %      Physical Exam  Vitals and nursing note reviewed.   Constitutional:       General: She is not in acute distress.     Appearance: She is well-developed. She is not ill-appearing, toxic-appearing or diaphoretic.   HENT:      Head: Normocephalic and atraumatic.   Eyes:      Pupils: Pupils are equal, round, and reactive to light.   Cardiovascular:      Rate and Rhythm: Normal rate and regular rhythm.  No extrasystoles are present.     Pulses:           Carotid pulses are 2+ on the right side and 2+ on the left side.       Radial pulses are 2+ on the right side and 2+ on the left side.        Dorsalis pedis pulses are 2+ on the right side and 2+ on the left side.        Posterior tibial pulses are 2+ on the right side and 2+ on the left side.      Heart sounds: Heart sounds not distant. No murmur heard.   No systolic murmur is present.    No friction rub.   Pulmonary:      Effort: Pulmonary effort is normal. No tachypnea, accessory muscle usage or respiratory distress.      Breath sounds: Normal breath sounds.   Chest:      Chest wall: No tenderness, crepitus or edema.   Abdominal:      General: Bowel sounds are normal. There is no abdominal bruit.      Palpations: Abdomen is soft.   Musculoskeletal:         General: Normal range of motion.      Cervical back: Normal range of motion.      Right lower leg: No edema.      Left lower leg: No edema.      Comments: Mild diffuse tenderness to trapezius muscles   Skin:     General: Skin is warm.      Capillary Refill: Capillary refill takes less than 2 seconds.   Neurological:      General: No focal deficit present.      Mental Status: She is alert and oriented to person, place, and time.   Psychiatric:          Mood and Affect: Mood normal.         ED Course        ED Course as of 03/21/22 1322   Mon Mar 21, 2022   1321 Chest XR,  PA & LAT  CXR reassuring against pneumonia or pneumothorax. While narrow mediastinum does not rule out AAA, it is consistent with her symmetric pulses and no radiation to back and otherwise appearing well in a 21 yoF.   1322 Findings were discussed with the patient. Additional verbal instructions were discussed with the patient as well. Instructed to follow up with a primary care provider within 3 days. Also discussed specific warning signs and instructed to return to the ED if there are any concerns. Patient voiced understanding of instructions, questions were answered and the patient was discharged home in stable condition.     Procedures              EKG Interpretation:      Interpreted by POLO MATTHEWS MD  Time reviewed: 1308  Symptoms at time of EKG: Chest Pain   Rhythm: normal sinus   Rate: normal  Axis: normal  Ectopy: none  Conduction: normal  ST Segments/ T Waves: Non-specific ST-T wave changes, likely somewhat age related  Q Waves: none  Comparison to prior: Unchanged    Clinical Impression: normal EKG           Results for orders placed or performed during the hospital encounter of 03/21/22 (from the past 24 hour(s))   Chest XR,  PA & LAT    Narrative    PROCEDURE: XR CHEST 2 VW 3/21/2022 1:16 PM    HISTORY: Chest Pain    COMPARISONS: 1/16/2019.    TECHNIQUE: 2 views.    FINDINGS: Heart and pulmonary vasculature are normal. Lungs are clear  and no pleural effusion is seen. There is a slight scoliosis.         Impression    IMPRESSION: No acute infiltrate.    VINCENZO ENRIQUEZ MD         SYSTEM ID:  [id number]       Medications - No data to display    Assessments & Plan (with Medical Decision Making)     I have reviewed the nursing notes.    Hx and physical exam elements not consistent with PE. PERC negative, Wells score of 0. Not pregnant per patient. Not consistent with MI and  EKG reassuring. Seems most likely MSK related. No vomiting to suggest esophageal pain. Has mild headache too. No evidence of infection. No cough. No fever. Otherwise well. Cardiopulmonary exam is normal. If CXR unremarkable, okay for discharge. Needs follow-up with PCP in 2-3 days if symptoms persist to consider further testing. Glucose borderline low while here but she was given snack here and eating without issue.    I have reviewed the findings, diagnosis, plan and need for follow up with the patient.    New Prescriptions    No medications on file       Final diagnoses:   Chest pain, unspecified type       3/21/2022   [facility]     Rufus Park MD  03/21/22 1322

## 2022-03-26 ENCOUNTER — HEALTH MAINTENANCE LETTER (OUTPATIENT)
Age: 22
End: 2022-03-26

## 2022-04-04 ENCOUNTER — LAB (OUTPATIENT)
Dept: LAB | Facility: HOSPITAL | Age: 22
End: 2022-04-04
Payer: COMMERCIAL

## 2022-04-04 DIAGNOSIS — E10.9 TYPE 1 DIABETES MELLITUS WITHOUT COMPLICATION (H): ICD-10-CM

## 2022-04-04 LAB
CREAT UR-MCNC: 96 MG/DL
EST. AVERAGE GLUCOSE BLD GHB EST-MCNC: 114 MG/DL
HBA1C MFR BLD: 5.6 % (ref 0–5.6)
MICROALBUMIN UR-MCNC: <5 MG/L
MICROALBUMIN/CREAT UR: NORMAL MG/G{CREAT}

## 2022-04-04 PROCEDURE — 82043 UR ALBUMIN QUANTITATIVE: CPT

## 2022-04-04 PROCEDURE — 36415 COLL VENOUS BLD VENIPUNCTURE: CPT

## 2022-04-04 PROCEDURE — 83036 HEMOGLOBIN GLYCOSYLATED A1C: CPT

## 2022-04-18 NOTE — PATIENT INSTRUCTIONS
REDUCE LANTUS INSULIN FROM 8 TO 6 UNITS DAILY TO AVOID NIGHT TIME HYPOGLYCEMIA    OK TO WAIT UP TO 10 MINUTES AFTER EATING TO TAKE NOVOLOG INSULIN TO TRY TO AVOID THE NAUSEA YOU HAVE BEEN HAVING WITH TAKING IT    During pregnancy:  Fasting blood sugar goals are <100  1 hours after eating blood sugar goals are: <140  2 hours after eating blood sugar goals are: <120    If your fasting blood sugars are higher than 100, increase lantus (basal, long-acting insulin) by 2 units twice weekly until fasting blood sugars are <100.    Start novolog insulin correction scale before meals:  Blood sugar less than 120 no insulin correction or bedtime insulin  Blood sugar 120 to 170, take 1 unit  Blood sugar 171 to 220, take 2 units  Blood sugar 221 to 270, take 3 units  Blood sugar 271 to 320, take 4 units  Blood sugar 321 to 370, take 5 units  Blood sugar 371-420, take 6 units     For questions about blood sugars, please call [phone] anytime & ask the  to page diabetes on-call.    1.  Test your blood sugar before each meal and two hours after each meal and at bedtime.  Your blood sugar goals are:  Before meals: <100 mg/dl  Two hours after a meal: <120 mg/dl     2.  Eat three meals a day and snacks between meals.  Use my Fitness Pal to document your food intake.  Carb count your food and take 1 unit of Novolog per per 30 grams of carb to start.     3.  Walk as much as you can.       4.  Take your Lantus every day.       5.  Carry something with you to treat lows.     6.  Work on being consistent with meal times and exercise.     no

## 2022-04-26 DIAGNOSIS — E10.9 WELL CONTROLLED TYPE 1 DIABETES MELLITUS (H): ICD-10-CM

## 2022-04-26 RX ORDER — INSULIN ASPART 100 [IU]/ML
INJECTION, SOLUTION INTRAVENOUS; SUBCUTANEOUS
Qty: 15 ML | Refills: 1 | Status: SHIPPED | OUTPATIENT
Start: 2022-04-26 | End: 2023-06-18

## 2022-04-26 NOTE — TELEPHONE ENCOUNTER
NOVOLOG PENFILL 100 UNIT/ML soln        Last Written Prescription Date:  03/30/20  Last Fill Quantity: 15mL,   # refills: 3  Last Office Visit : 12/21/20  Future Office visit:  None scheduled    Routing refill request to provider for review/approval because:  Insulin - refilled per clinic

## 2022-04-26 NOTE — TELEPHONE ENCOUNTER
Short Acting Insulin Protocol Failed 04/26/2022 09:42 AM   Protocol Details  Recent (6 mo) or future (30 days) visit within the authorizing provider's specialty

## 2022-05-21 ENCOUNTER — HEALTH MAINTENANCE LETTER (OUTPATIENT)
Age: 22
End: 2022-05-21

## 2022-08-01 ENCOUNTER — OFFICE VISIT (OUTPATIENT)
Dept: CHIROPRACTIC MEDICINE | Facility: OTHER | Age: 22
End: 2022-08-01
Attending: CHIROPRACTOR
Payer: COMMERCIAL

## 2022-08-01 DIAGNOSIS — M54.50 ACUTE BILATERAL LOW BACK PAIN WITHOUT SCIATICA: ICD-10-CM

## 2022-08-01 DIAGNOSIS — M99.03 SEGMENTAL AND SOMATIC DYSFUNCTION OF LUMBAR REGION: ICD-10-CM

## 2022-08-01 DIAGNOSIS — M99.01 SEGMENTAL AND SOMATIC DYSFUNCTION OF CERVICAL REGION: ICD-10-CM

## 2022-08-01 DIAGNOSIS — M99.02 SEGMENTAL AND SOMATIC DYSFUNCTION OF THORACIC REGION: Primary | ICD-10-CM

## 2022-08-01 PROCEDURE — 98941 CHIROPRACT MANJ 3-4 REGIONS: CPT | Mod: AT | Performed by: CHIROPRACTOR

## 2022-08-01 PROCEDURE — 99202 OFFICE O/P NEW SF 15 MIN: CPT | Mod: 25 | Performed by: CHIROPRACTOR

## 2022-08-01 NOTE — PROGRESS NOTES
Subjective Finding:    Chief compalint: Patient presents with:  Back Pain: History of scoliotic curve shown on x ray.  States there is some pain associated with the back and neck regions.   Pain off and on and somewhat related to a postural issue.  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    , Pain Scale: 5/10, Intensity: sharp, Duration: 2 months, Radiating: no.    Date of injury:     Activities that the pain restricts:   Home/household/hobbies/social activities: yes.  Work duties: yes.  Sleep: yes.  Makes symptoms better: rest.  Makes symptoms worse: activity.  Have you seen anyone else for the symptoms? Yes: MD.  Work related: no.  Automobile related injury: no.    Objective and Assessment:    Posture Analysis:   High shoulder: right.  Head tilt: right.  High iliac crest: left.  Head carriage: neutral.  Thoracic Kyphosis: neutral.  Lumbar Lordosis: neutral.    Lumbar Range of Motion: extension decreased.  Cervical Range of Motion: extension decreased.  Thoracic Range of Motion: extension decreased.  Extremity Range of Motion: .    Palpation:   T paraspinals: sharp pain, no    Segmental dysfunction pre-treatment and treatment area: C5, C6, T3, T6, T7 and L5.    Assessment post-treatment:  Cervical: ROM increased.  Thoracic: ROM increased.  Lumbar: ROM increased.    Comments: scoliotic curve.      Complicating Factors: structural abnormalities.    Procedure(s):  CMT:  19785 Chiropractic manipulative treatment 3-4 regions performed   Cervical: Diversified, See above for level, Supine, Thoracic: Diversified, See above for level, Prone and Pelvis: Diversified, See above for level, Side posture    Modalities:  None performed this visit    Therapeutic procedures:  None    Plan:  Treatment plan: 2 times per week for 2 weeks.  Instructed patient: stretch as instructed at visit and walk 10 minutes.  Short term goals: increase ROM.  Long term goals: increase ADL.  Prognosis: very good.

## 2022-08-02 ENCOUNTER — TELEPHONE (OUTPATIENT)
Dept: CHIROPRACTIC MEDICINE | Facility: OTHER | Age: 22
End: 2022-08-02

## 2022-08-03 ENCOUNTER — OFFICE VISIT (OUTPATIENT)
Dept: CHIROPRACTIC MEDICINE | Facility: OTHER | Age: 22
End: 2022-08-03
Attending: CHIROPRACTOR
Payer: COMMERCIAL

## 2022-08-03 DIAGNOSIS — M54.2 CERVICALGIA: ICD-10-CM

## 2022-08-03 DIAGNOSIS — M99.02 SEGMENTAL AND SOMATIC DYSFUNCTION OF THORACIC REGION: ICD-10-CM

## 2022-08-03 DIAGNOSIS — M99.01 SEGMENTAL AND SOMATIC DYSFUNCTION OF CERVICAL REGION: Primary | ICD-10-CM

## 2022-08-03 DIAGNOSIS — M99.03 SEGMENTAL AND SOMATIC DYSFUNCTION OF LUMBAR REGION: ICD-10-CM

## 2022-08-03 PROCEDURE — 98941 CHIROPRACT MANJ 3-4 REGIONS: CPT | Mod: AT | Performed by: CHIROPRACTOR

## 2022-08-08 DIAGNOSIS — E10.9 TYPE 1 DIABETES MELLITUS WITHOUT COMPLICATION (H): ICD-10-CM

## 2022-08-08 DIAGNOSIS — E10.9 WELL CONTROLLED TYPE 1 DIABETES MELLITUS (H): ICD-10-CM

## 2022-08-08 DIAGNOSIS — E10.65 TYPE 1 DIABETES MELLITUS WITH HYPERGLYCEMIA (H): ICD-10-CM

## 2022-08-08 RX ORDER — PROCHLORPERAZINE 25 MG/1
SUPPOSITORY RECTAL
Qty: 1 EACH | Refills: 4 | Status: SHIPPED | OUTPATIENT
Start: 2022-08-08 | End: 2023-08-02

## 2022-08-08 RX ORDER — PROCHLORPERAZINE 25 MG/1
1 SUPPOSITORY RECTAL
Qty: 3 EACH | Refills: 11 | Status: SHIPPED | OUTPATIENT
Start: 2022-08-08 | End: 2023-08-02

## 2022-08-08 NOTE — TELEPHONE ENCOUNTER
Patient is also requesting call back for an urgent same day appointment with Soni Daniel if available. Per patient been 2 years sine able to connect with Endocrine providers .

## 2022-08-08 NOTE — TELEPHONE ENCOUNTER
Continuous Blood Gluc Transmit (DEXCOM G6 TRANSMITTER) MISC    Continuous Blood Gluc Sensor (DEXCOM G6 SENSOR) [facility] [place]    12/21/2020  [facility] [place]     Soni Daniel PA-C    Physician Assistant     Nv: none    Routed because:  see pt call. request urgent same day appt. Requests meter. How many times testing daily? Does  she need  also?  Hx 1 cancelled appt.  Have not called pt. thanks

## 2022-08-08 NOTE — TELEPHONE ENCOUNTER
[facility] Call Center    Phone Message    May a detailed message be left on voicemail: yes     Reason for Call: Medication Refill Request    Has the patient contacted the pharmacy for the refill? Yes   Name of medication being requested: A new order for a Blood Glucose Monitor     Also     Continuous Blood Gluc Transmit (DEXCOM G6 TRANSMITTER) MISC      Continuous Blood Gluc Sensor (DEXCOM G6 SENSOR) [facility]          Provider who prescribed the medication: Previously Dr. Correia     Pharmacy: [phone]    Date medication is needed: asap urgent need leaving out of town today .           Action Taken: Other: ENDO    Travel Screening: Not Applicable

## 2022-08-15 NOTE — PROGRESS NOTES
Subjective Finding:    Chief compalint: Patient presents with:  Neck Pain  , Pain Scale: 5/10, Intensity: sharp, Duration: 2 months, Radiating: no.    Date of injury:     Activities that the pain restricts:   Home/household/hobbies/social activities: yes.  Work duties: yes.  Sleep: yes.  Makes symptoms better: rest.  Makes symptoms worse: activity.  Have you seen anyone else for the symptoms? Yes: MD.  Work related: no.  Automobile related injury: no.    Objective and Assessment:    Posture Analysis:   High shoulder: right.  Head tilt: right.  High iliac crest: left.  Head carriage: neutral.  Thoracic Kyphosis: neutral.  Lumbar Lordosis: neutral.    Lumbar Range of Motion: extension decreased.  Cervical Range of Motion: extension decreased.  Thoracic Range of Motion: extension decreased.  Extremity Range of Motion: .    Palpation:   T paraspinals: sharp pain, no    Segmental dysfunction pre-treatment and treatment area: C5, C6, T3, T6, T7 and L5.    Assessment post-treatment:  Cervical: ROM increased.  Thoracic: ROM increased.  Lumbar: ROM increased.    Comments: scoliotic curve.      Complicating Factors: structural abnormalities.    Procedure(s):  [facility]:  88555 Chiropractic manipulative treatment 3-4 regions performed   Cervical: Diversified, See above for level, Supine, Thoracic: Diversified, See above for level, Prone and Pelvis: Diversified, See above for level, Side posture    Modalities:  None performed this visit    Therapeutic procedures:  None    Plan:  Treatment plan: 2 times per week for 2 weeks.  Instructed patient: stretch as instructed at visit and walk 10 minutes.  Short term goals: increase ROM.  Long term goals: increase ADL.  Prognosis: very good.

## 2022-09-08 ENCOUNTER — OFFICE VISIT (OUTPATIENT)
Dept: CHIROPRACTIC MEDICINE | Facility: OTHER | Age: 22
End: 2022-09-08
Attending: CHIROPRACTOR
Payer: COMMERCIAL

## 2022-09-08 DIAGNOSIS — M99.03 SEGMENTAL AND SOMATIC DYSFUNCTION OF LUMBAR REGION: ICD-10-CM

## 2022-09-08 DIAGNOSIS — M99.01 SEGMENTAL AND SOMATIC DYSFUNCTION OF CERVICAL REGION: ICD-10-CM

## 2022-09-08 DIAGNOSIS — M99.02 SEGMENTAL AND SOMATIC DYSFUNCTION OF THORACIC REGION: Primary | ICD-10-CM

## 2022-09-08 DIAGNOSIS — M54.50 ACUTE BILATERAL LOW BACK PAIN WITHOUT SCIATICA: ICD-10-CM

## 2022-09-08 PROCEDURE — 98941 CHIROPRACT MANJ 3-4 REGIONS: CPT | Mod: AT | Performed by: CHIROPRACTOR

## 2022-09-08 NOTE — PROGRESS NOTES
Subjective Finding:    Chief compalint: Patient presents with:  Back Pain  , Pain Scale: 5/10, Intensity: sharp, Duration: 2 months, Radiating: no.    Date of injury:     Activities that the pain restricts:   Home/household/hobbies/social activities: yes.  Work duties: yes.  Sleep: yes.  Makes symptoms better: rest.  Makes symptoms worse: activity.  Have you seen anyone else for the symptoms? Yes: MD.  Work related: no.  Automobile related injury: no.    Objective and Assessment:    Posture Analysis:   High shoulder: right.  Head tilt: right.  High iliac crest: left.  Head carriage: neutral.  Thoracic Kyphosis: neutral.  Lumbar Lordosis: neutral.    Lumbar Range of Motion: extension decreased.  Cervical Range of Motion: extension decreased.  Thoracic Range of Motion: extension decreased.  Extremity Range of Motion: .    Palpation:   T paraspinals: sharp pain, no    Segmental dysfunction pre-treatment and treatment area: C5, C6, T3, T6, T7 and L5.    Assessment post-treatment:  Cervical: ROM increased.  Thoracic: ROM increased.  Lumbar: ROM increased.    Comments: scoliotic curve.      Complicating Factors: structural abnormalities.    Procedure(s):  CMT:  12159 Chiropractic manipulative treatment 3-4 regions performed   Cervical: Diversified, See above for level, Supine, Thoracic: Diversified, See above for level, Prone and Pelvis: Diversified, See above for level, Side posture    Modalities:  None performed this visit    Therapeutic procedures:  None    Plan:  Treatment plan: 2 times per week for 2 weeks.  Instructed patient: stretch as instructed at visit and walk 10 minutes.  Short term goals: increase ROM.  Long term goals: increase ADL.  Prognosis: very good.

## 2022-09-18 ENCOUNTER — HEALTH MAINTENANCE LETTER (OUTPATIENT)
Age: 22
End: 2022-09-18

## 2022-09-27 NOTE — TELEPHONE ENCOUNTER
Krystian from [facility] has request for Dexcom Transmitter and sensor.Updated him that pt has refills at [facility] and gave phone # and provider name.He will call to have RX transferred.    Brittany Clemente RN

## 2022-10-12 ENCOUNTER — OFFICE VISIT (OUTPATIENT)
Dept: CHIROPRACTIC MEDICINE | Facility: OTHER | Age: 22
End: 2022-10-12
Attending: CHIROPRACTOR
Payer: COMMERCIAL

## 2022-10-12 DIAGNOSIS — M54.2 CERVICALGIA: ICD-10-CM

## 2022-10-12 DIAGNOSIS — M99.02 SEGMENTAL AND SOMATIC DYSFUNCTION OF THORACIC REGION: ICD-10-CM

## 2022-10-12 DIAGNOSIS — M99.01 SEGMENTAL AND SOMATIC DYSFUNCTION OF CERVICAL REGION: Primary | ICD-10-CM

## 2022-10-12 DIAGNOSIS — M99.03 SEGMENTAL AND SOMATIC DYSFUNCTION OF LUMBAR REGION: ICD-10-CM

## 2022-10-12 PROCEDURE — 98941 CHIROPRACT MANJ 3-4 REGIONS: CPT | Mod: AT | Performed by: CHIROPRACTOR

## 2022-10-12 NOTE — PROGRESS NOTES
Subjective Finding:    Chief compalint: Patient presents with:  Neck Pain  , Pain Scale: 5/10, Intensity: sharp, Duration: 2 months, Radiating: no.    Date of injury:     Activities that the pain restricts:   Home/household/hobbies/social activities: yes.  Work duties: yes.  Sleep: yes.  Makes symptoms better: rest.  Makes symptoms worse: activity.  Have you seen anyone else for the symptoms? Yes: MD.  Work related: no.  Automobile related injury: no.    Objective and Assessment:    Posture Analysis:   High shoulder: right.  Head tilt: right.  High iliac crest: left.  Head carriage: neutral.  Thoracic Kyphosis: neutral.  Lumbar Lordosis: neutral.    Lumbar Range of Motion: extension decreased.  Cervical Range of Motion: extension decreased.  Thoracic Range of Motion: extension decreased.  Extremity Range of Motion: .    Palpation:   T paraspinals: sharp pain, no    Segmental dysfunction pre-treatment and treatment area: C5, C6, T3, T6, T7 and L5.    Assessment post-treatment:  Cervical: ROM increased.  Thoracic: ROM increased.  Lumbar: ROM increased.    Comments: scoliotic curve.      Complicating Factors: structural abnormalities.    Procedure(s):  [facility]:  85713 Chiropractic manipulative treatment 3-4 regions performed   Cervical: Diversified, See above for level, Supine, Thoracic: Diversified, See above for level, Prone and Pelvis: Diversified, See above for level, Side posture    Modalities:  None performed this visit    Therapeutic procedures:  None    Plan:  Treatment plan: 2 times per week for 2 weeks.  Instructed patient: stretch as instructed at visit and walk 10 minutes.  Short term goals: increase ROM.  Long term goals: increase ADL.  Prognosis: very good.

## 2023-01-28 ENCOUNTER — HEALTH MAINTENANCE LETTER (OUTPATIENT)
Age: 23
End: 2023-01-28

## 2023-03-08 ENCOUNTER — ALLIED HEALTH/NURSE VISIT (OUTPATIENT)
Dept: FAMILY MEDICINE | Facility: OTHER | Age: 23
End: 2023-03-08
Payer: COMMERCIAL

## 2023-03-08 DIAGNOSIS — Z11.1 TUBERCULIN SKIN TEST ENCOUNTER: Primary | ICD-10-CM

## 2023-03-08 PROCEDURE — 86580 TB INTRADERMAL TEST: CPT

## 2023-03-08 NOTE — PROGRESS NOTES
Patient is here today for a Mantoux (TST) test placement.    Is there a current order in the chart? 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 No - form not needed per patient.    Instructed patient to wait for 15 minutes post injection and to report any reactions immediately to staff.    Told patient to return to clinic in 48-72 hours to have Mantoux (TST) read.

## 2023-03-10 ENCOUNTER — LAB (OUTPATIENT)
Dept: LAB | Facility: OTHER | Age: 23
End: 2023-03-10
Payer: COMMERCIAL

## 2023-03-10 ENCOUNTER — ALLIED HEALTH/NURSE VISIT (OUTPATIENT)
Dept: FAMILY MEDICINE | Facility: OTHER | Age: 23
End: 2023-03-10
Attending: FAMILY MEDICINE
Payer: COMMERCIAL

## 2023-03-10 DIAGNOSIS — Z11.1 TUBERCULIN SKIN TEST ENCOUNTER: Primary | ICD-10-CM

## 2023-03-10 DIAGNOSIS — Z11.1 SCREENING EXAMINATION FOR PULMONARY TUBERCULOSIS: ICD-10-CM

## 2023-03-10 PROCEDURE — 86481 TB AG RESPONSE T-CELL SUSP: CPT | Mod: ZL

## 2023-03-10 PROCEDURE — 36415 COLL VENOUS BLD VENIPUNCTURE: CPT | Mod: ZL

## 2023-03-10 NOTE — PROGRESS NOTES
Pt needs a two step mantoux for school however is needing screening to be completed sooner than second step can be completed. TB gold lab draw order with provider permission. Pt sent to lab.

## 2023-03-10 NOTE — PROGRESS NOTES
Patient is here today for a Mantoux (TST) test results.    Did patient return to clinic 48-72 hours from Mantoux (TST) placement:   Yes -     PPD Induration   Date Value Ref Range Status   10/05/2020 0 0 - 5 mm      PPD Redness   Date Value Ref Range Status   10/05/2020 0 mm          Induration Size? Induration <5mm - Enter results in Enter/Edit Activity. Route results to ordering provider.     Patient needs form signed? No    Patient reports having previously had the BCG Vaccine: No    Does patient need a two step?  Yes - placed order according to standing order or notified LP for need for next TST.  Instructed patient when to return to the clinic.

## 2023-03-12 LAB
QUANTIFERON MITOGEN: 10 IU/ML
QUANTIFERON NIL TUBE: 0.04 IU/ML
QUANTIFERON TB1 TUBE: 0.04 IU/ML
QUANTIFERON TB2 TUBE: 0.04

## 2023-03-13 LAB
GAMMA INTERFERON BACKGROUND BLD IA-ACNC: 0.04 IU/ML
M TB IFN-G BLD-IMP: NEGATIVE
M TB IFN-G CD4+ BCKGRND COR BLD-ACNC: 9.96 IU/ML
MITOGEN IGNF BCKGRD COR BLD-ACNC: 0 IU/ML
MITOGEN IGNF BCKGRD COR BLD-ACNC: 0 IU/ML

## 2023-04-25 NOTE — PROGRESS NOTES
"Outcome for 04/25/23 10:34 AM: [employer] message sent  Little Villafuerte MA  Outcome for 04/28/23 9:06 AM: Data obtained via Dexcom website  Concepción Abreu LPN     Start time: 0804  End time: 0833  Provider location: off site- home  Patient location: off site- home.      HPI:     Devin is a 23 yo woman here for follow up of type 1 diabetes, diagnosed just prior to pregnancy in January, 2019. She also has seen Dr. Correia and Rosibel Garcia in the past.  I have not seen her since March, 2020, just after her pregnancy.  She is here to re-establish care.  Following the delivery of her baby, her glucose was well controlled and she was taking very little insulin- just 1/30g with \"very sugary foods.\"  She had been on no long acting insulin.  She has continued wearing a dexcom sensor and has stayed healthy, even on no insulin.  Her glucose sometimes climbs, but comes down quickly with activity.  She would like to have labs done today.         Devin now lives in [place], [place] and is finishing up nursing school.  She lives with her 3 year old daughter.  She will be taking a job locally and plans to stay in [place] for at least a year.      Typical day:   Wakes up- often skips breakfast.    Not much appetite because of the adderall.      Does not eat a lot.   Does not eat a lot of Jamaican foods anymore, which has helped her glucose control.     Lunch- typically meat (chicken, salmon).  Sweet potatoes, banans, grapes  No milk  Drinks sugar free juice.   No coffee because of adderall.        Devin does understand that she has autoimmune type 1 diabetes and that she is likely honeymooning.  She understands that she did not have gestational diabetes and that it is very important to continue monitoring her glucose on the dexcom sensor.      Patient wears a Dexcom G6 sensor with an overall average of 118 mg/dL (CV 27%, TIR 91%, hypo 4%, severe hypo 0%) over the past 2 weeks. Recent glucose is as follows:            -Dexcom was off for a few " days-        Past Medical History:   Diagnosis Date     Type I diabetes mellitus (H)     Recently Dx: Jan 2019       Past Surgical History:   Procedure Laterality Date     NO HISTORY OF SURGERY         Family History   Problem Relation Age of Onset     Gestational Diabetes Mother      Diabetes Type 2  Maternal Grandmother      Heart Disease Father      Hypertension Father      Glaucoma No family hx of      Macular Degeneration No family hx of    Maternal grandmother-   Social Hx:  Single. Lives with her 3 yo daughter, Lavinia.  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   She is [place], but was born in [facility].  Came to the [place] at age 4.     Social History     Socioeconomic History     Marital status:      Spouse name: Swathi Castro     Number of children: None     Years of education: None     Highest education level: None   Occupational History     Occupation: Student    Social Needs     Financial resource strain: None     Food insecurity:     Worry: None     Inability: None     Transportation needs:     Medical: None     Non-medical: None   Tobacco Use     Smoking status: Never Smoker     Smokeless tobacco: Never Used   Substance and Sexual Activity     Alcohol use: No     Drug use: No     Sexual activity: Yes     Partners: Male   Lifestyle     Physical activity:     Days per week: None     Minutes per session: None     Stress: None   Relationships     Social connections:     Talks on phone: None     Gets together: None     Attends Cheondoism service: None     Active member of club or organization: None     Attends meetings of clubs or organizations: None     Relationship status: None     Intimate partner violence:     Fear of current or ex partner: None     Emotionally abused: None     Physically abused: None     Forced sexual activity: None   Other Topics Concern     None   Social History Narrative    How much exercise per week? none    How much calcium per day?  PNV, some foods        How much caffeine per day? none    How much vitamin D per day? PNV    Do you/your family wear seatbelts?  Yes    Do you/your family use safety helmets? n/a    Do you/your family use sunscreen? When in sun    Do you/your family keep firearms in the home? No    Do you/your family have a smoke detector(s)? Yes        March 6, 2019 Adrien James LPN       Current Outpatient Medications   Medication     acetaminophen (TYLENOL) 325 MG tablet     amphetamine-dextroamphetamine (ADDERALL) 5 MG tablet     Ascorbic Acid (VITAMIN C PO)     blood glucose (NO BRAND SPECIFIED) test strip     blood glucose (NO BRAND SPECIFIED) test strip     blood glucose (ONETOUCH VERIO IQ) test strip     blood glucose monitoring (NO BRAND SPECIFIED) meter device kit     Blood Pressure Monitoring (BLOOD PRESSURE CUFF) MISC     Cholecalciferol (VITAMIN D-3) 125 MCG (5000 UT) TABS     Continuous Blood Gluc Sensor (DEXCOM G6 SENSOR) MISC     Continuous Blood Gluc Sensor (DEXCOM G6 SENSOR) MISC     Continuous Blood Gluc Transmit (DEXCOM G6 TRANSMITTER) MISC     Cyanocobalamin (VITAMIN B12 PO)     FOLIC ACID PO     glucagon (GLUCAGON EMERGENCY) 1 MG kit     Injection Device for insulin (INPEN 100-GREY-JANET) TALI     Injection Device for insulin (INPEN 100-GREY-JANET) TALI     insulin glargine (LANTUS PEN) 100 UNIT/ML pen     insulin pen needle (32G X 4 MM) 32G X 4 MM miscellaneous     NOVOLOG PENFILL 100 UNIT/ML soln     No current facility-administered medications for this visit.        No Known Allergies    Physical Exam:  There were no vitals taken for this visit.  GENERAL: healthy, alert and no distress  RESP: no audible wheeze, cough, or visible cyanosis.  No visible retractions or increased work of breathing.  Able to speak fully in complete sentences.  PSYCH: mentation appears normal, affect normal/bright, judgement and insight intact, normal speech and appearance well-groomed      RESULTS  Lab Results   Component Value Date     A1C 5.6 04/04/2022    A1C 6.2 (H) 08/25/2021    A1C 6.1 (H) 04/30/2021    A1C 6.7 (H) 01/05/2021    A1C 6.0 (H) 04/05/2019    A1C 7.6 (H) 03/06/2019    A1C 8.8 (H) 02/18/2019    HEMOGLOBINA1 5.1 01/06/2020    HEMOGLOBINA1 5.1 07/12/2019    HEMOGLOBINA1 5.3 05/24/2019    HEMOGLOBINA1 5.7 04/01/2019    HEMOGLOBINA1 10.8 (A) 01/23/2019       TSH   Date Value Ref Range Status   01/05/2021 2.49 0.40 - 4.00 mU/L Final   04/05/2019 1.59 0.40 - 4.00 mU/L Final   05/14/2018 1.48 0.40 - 4.00 mU/L Final       ALT   Date Value Ref Range Status   08/25/2021 26 0 - 50 U/L Final   10/10/2019 25 0 - 50 U/L Final   10/08/2019 31 0 - 50 U/L Final   ]    Recent Labs   Lab Test 08/25/21  1554 04/30/21  1032   CHOL 188 155   HDL 50 50   LDL 98 95   TRIG 201* 51       Lab Results   Component Value Date     08/25/2021     04/30/2021      Lab Results   Component Value Date    POTASSIUM 3.9 08/25/2021    POTASSIUM 3.8 04/30/2021     Lab Results   Component Value Date    CHLORIDE 107 08/25/2021    CHLORIDE 109 04/30/2021     Lab Results   Component Value Date    UNA 9.2 08/25/2021    UNA 8.5 04/30/2021     Lab Results   Component Value Date    CO2 27 08/25/2021    CO2 24 04/30/2021     Lab Results   Component Value Date    BUN 12 08/25/2021    BUN 18 04/30/2021     Lab Results   Component Value Date    CR 0.71 08/25/2021    CR 0.68 04/30/2021       GFR Estimate   Date Value Ref Range Status   08/25/2021 >90 >60 mL/min/1.73m2 Final     Comment:     As of July 11, 2021, eGFR is calculated by the CKD-EPI creatinine equation, without race adjustment. eGFR can be influenced by muscle mass, exercise, and diet. The reported eGFR is an estimation only and is only applicable if the renal function is stable.   04/30/2021 >90 >60 mL/min/[1.73_m2] Final     Comment:     Non  GFR Calc  Starting 12/18/2018, serum creatinine based estimated GFR (eGFR) will be   calculated using the Chronic Kidney Disease Epidemiology  Collaboration   (CKD-EPI) equation.     01/05/2021 >90 >60 mL/min/[1.73_m2] Final     Comment:     Non  GFR Calc  Starting 12/18/2018, serum creatinine based estimated GFR (eGFR) will be   calculated using the Chronic Kidney Disease Epidemiology Collaboration   (CKD-EPI) equation.     10/08/2019 >90 >60 mL/min/[1.73_m2] Final     Comment:     Non  GFR Calc  Starting 12/18/2018, serum creatinine based estimated GFR (eGFR) will be   calculated using the Chronic Kidney Disease Epidemiology Collaboration   (CKD-EPI) equation.       GFR Estimate If Black   Date Value Ref Range Status   04/30/2021 >90 >60 mL/min/[1.73_m2] Final     Comment:      GFR Calc  Starting 12/18/2018, serum creatinine based estimated GFR (eGFR) will be   calculated using the Chronic Kidney Disease Epidemiology Collaboration   (CKD-EPI) equation.     01/05/2021 >90 >60 mL/min/[1.73_m2] Final     Comment:      GFR Calc  Starting 12/18/2018, serum creatinine based estimated GFR (eGFR) will be   calculated using the Chronic Kidney Disease Epidemiology Collaboration   (CKD-EPI) equation.     10/08/2019 >90 >60 mL/min/[1.73_m2] Final     Comment:      GFR Calc  Starting 12/18/2018, serum creatinine based estimated GFR (eGFR) will be   calculated using the Chronic Kidney Disease Epidemiology Collaboration   (CKD-EPI) equation.         Lab Results   Component Value Date    MICROL <5 04/04/2022    MICROL 9 01/05/2021     No results found for: MICROALBUMIN  Lab Results   Component Value Date    CPEPT 3.1 07/10/2019    GADAB 72.1 (H) 02/14/2019    ISCAB <1:4 02/14/2019       No results found for: B12]    Most recent eye exam date: : Not Found     Glutamic Acid Decarboxylase Antibody   Date Value Ref Range Status   02/14/2019 72.1 (H) 0.0 - 5.0 IU/mL Final     Comment:     (Note)  INTERPRETIVE INFORMATION:  Glutamic Acid Decarboxylase   Antibody  A value greater than 5.0 IU/mL is  considered positive for   Glutamic Acid Decarboxylase Antibody (KEISHA Ab). This assay   is intended for the semi-quantitative determination of the   KEISHA Ab in human serum. Results should be interpreted within   the context of clinical symptoms.  Performed by [facility],  [address] [place] [address] [phone]  [email or web address] Solomon Clark MD, Lab. Director       C Peptide   Date Value Ref Range Status   07/10/2019 3.1 0.9 - 6.9 ng/mL Final     Assessment/Plan:     1.  Type 1 diabetes-  KEISHA positive- no insulin requirements for the past few years since delivery of her daughter.  She does have some post-prandial spikes up to 180 occasionally, but they do not stay high.  Will prescribe insulin for her, but she does not seem to need it at this time. She is likely honeymooning, and her insulin requirements could increase rapidly, particularly in the context of illness. Will prescribe ketone strips, as she is at risk for DKA.  She feels comfortable wearing her dexcom sensor regularly and watching her glucose closely.  Will contact me if she sees her glucose values going over 140 mg/dL.      have prescribed insulin for you.  No need to take Lantus now, but you may need correction Novolog.    Take 1 unit/100 mg/dL over 175 mg/dL.      Lab [phone]    Schedule annually with me and with diabetes education.  Consider Omnipod pump if you need insulin in the future.  [email or web address]    Schedule annual diabetic eye exam.       Emergency issues: Here are some concerns you should contact us about.  -Vomiting: more than twice.  Please check ketones.  If positive, go to ER. Monitor glucose hourly.   -High glucose (over 300 mg/dL twice in a row): Please check ketones.  If ketones are negative, take an insulin correction and recheck glucose in 1 hour.  If glucose is not coming down, please call the clinic. If ketones are moderate or large, drink lots of water, take an insulin correction 1.5 times your usual  correction, and recheck ketones in 1 hour.  If ketones are still present (or you are vomiting), go to the ER.  -Hypoglycemia (low glucose):   If glucose is less than 70 mg/dL, treat with 15g carb (4 glucose tablets), recheck glucose in 15 minutes.  If low again, repeat.   If glucose is less than 54 mg/dL, treat with 30g carb, recheck glucose in 15 minutes.  If low again, repeat.  Keep glucagon in your home in case of severe hypoglycemia and train someone how to use this.    Emergency kit (please ensure you always have these with you):   Glucose tablets  Glucagon  Insulin  Syringes/needles   Extra infusion set (if on a pump)  Ketone strips    Contact information:   If you have concerns, please send me a [employer] message or call the clinic at [phone].  For more urgent concerns, please call [phone] after hours/weekends and ask to speak with the endocrinologist on call.      Please let me know if you are having low blood sugars less than 70 or over 350 mg/dL.  Do not wait until your next appointment if this is happening.      2. Risk factors-     Retinopathy:  No. Due for eye exam. Referral placed.  Nephropathy:  BP historically well controlled. No microalbuminuria.  Creatinine stable.   Neuropathy: No.    Feet: OK, no ulcers.   Lipids:  LDL at target.     3. F/U every 6-12 months, sooner with hyperglycemia.     42 minutes spent on the date of the encounter doing chart review, review of test results, review of continuous glucose sensor, interpretation of glucose data, patient visit and documentation, counseling/coordination of care, and discussion of follow up plan for worsening hyper and hypoglycemia.  The patient understood and is satisfied with today's visit.        Soni Daniel PA-C, MPAS   [facility]  Department of Medicine  Division of Endocrinology and Diabetes        Answers for HPI/ROS submitted by the patient on 5/2/2023  General Symptoms: No  Skin Symptoms: No  HENT Symptoms: No  EYE  SYMPTOMS: No  HEART SYMPTOMS: No  LUNG SYMPTOMS: No  INTESTINAL SYMPTOMS: No  URINARY SYMPTOMS: No  GYNECOLOGIC SYMPTOMS: No  BREAST SYMPTOMS: No  SKELETAL SYMPTOMS: No  BLOOD SYMPTOMS: No  NERVOUS SYSTEM SYMPTOMS: No  MENTAL HEALTH SYMPTOMS: No

## 2023-05-02 ENCOUNTER — VIRTUAL VISIT (OUTPATIENT)
Dept: ENDOCRINOLOGY | Facility: CLINIC | Age: 23
End: 2023-05-02
Payer: COMMERCIAL

## 2023-05-02 DIAGNOSIS — E10.65 TYPE 1 DIABETES MELLITUS WITH HYPERGLYCEMIA (H): ICD-10-CM

## 2023-05-02 DIAGNOSIS — E10.9 TYPE 1 DIABETES MELLITUS WITHOUT COMPLICATION (H): ICD-10-CM

## 2023-05-02 DIAGNOSIS — E10.9 WELL CONTROLLED TYPE 1 DIABETES MELLITUS (H): Primary | ICD-10-CM

## 2023-05-02 PROCEDURE — 99215 OFFICE O/P EST HI 40 MIN: CPT | Mod: VID | Performed by: PHYSICIAN ASSISTANT

## 2023-05-02 RX ORDER — INSULIN ASPART 100 [IU]/ML
INJECTION, SOLUTION INTRAVENOUS; SUBCUTANEOUS
Qty: 15 ML | Refills: 3 | Status: SHIPPED | OUTPATIENT
Start: 2023-05-02 | End: 2023-08-02

## 2023-05-02 RX ORDER — DEXTROAMPHETAMINE SULFATE 10 MG/1
10 TABLET ORAL PRN
COMMUNITY
Start: 2023-01-08

## 2023-05-02 RX ORDER — URINE ACETONE TEST STRIPS
STRIP MISCELLANEOUS
Qty: 50 STRIP | Refills: 3 | Status: SHIPPED | OUTPATIENT
Start: 2023-05-02

## 2023-05-02 NOTE — NURSING NOTE
Is the patient currently in the state of [place]? YES    Visit mode:VIDEO    If the visit is dropped, the patient can be reconnected by: VIDEO VISIT: Send to e-mail at: [email or web address]    Will anyone else be joining the visit? NO      How would you like to obtain your AVS? MyChart    Are changes needed to the allergy or medication list? NO    Reason for visit: Video Visit

## 2023-05-02 NOTE — LETTER
"5/2/2023       RE: Fabio Lacey  [address]  [facility] [address]     Dear Colleague,    Thank you for referring your patient, Fabio Lacey, to the [facility] [place] at [facility]. Please see a copy of my visit note below.    Outcome for 04/25/23 10:34 AM: Ivaldi message sent  Little Villafuerte MA  Outcome for 04/28/23 9:06 AM: Data obtained via Dexcom website  Concepción Abreu LPN     Start time: 0804  End time: 0833  Provider location: off site- home  Patient location: off site- home.      HPI:     Devin is a 21 yo woman here for follow up of type 1 diabetes, diagnosed just prior to pregnancy in January, 2019. She also has seen Dr. Correia and Rosibel Garcia in the past.  I have not seen her since March, 2020, just after her pregnancy.  She is here to re-establish care.  Following the delivery of her baby, her glucose was well controlled and she was taking very little insulin- just 1/30g with \"very sugary foods.\"  She had been on no long acting insulin.  She has continued wearing a dexcom sensor and has stayed healthy, even on no insulin.  Her glucose sometimes climbs, but comes down quickly with activity.  She would like to have labs done today.         Devin now lives in [place], [place] and is finishing up nursing school.  She lives with her 3 year old daughter.  She will be taking a job locally and plans to stay in [place] for at least a year.      Typical day:   Wakes up- often skips breakfast.    Not much appetite because of the adderall.      Does not eat a lot.   Does not eat a lot of Norwegian foods anymore, which has helped her glucose control.     Lunch- typically meat (chicken, salmon).  Sweet potatoes, banans, grapes  No milk  Drinks sugar free juice.   No coffee because of adderall.        Devin does understand that she has autoimmune type 1 diabetes and that she is likely honeymooning.  She understands that she did " not have gestational diabetes and that it is very important to continue monitoring her glucose on the dexcom sensor.      Patient wears a Dexcom G6 sensor with an overall average of 118 mg/dL (CV 27%, TIR 91%, hypo 4%, severe hypo 0%) over the past 2 weeks. Recent glucose is as follows:            -Dexcom was off for a few days-        Past Medical History:   Diagnosis Date    Type I diabetes mellitus (H)     Recently Dx: Jan 2019       Past Surgical History:   Procedure Laterality Date    NO HISTORY OF SURGERY         Family History   Problem Relation Age of Onset    Gestational Diabetes Mother     Diabetes Type 2  Maternal Grandmother     Heart Disease Father     Hypertension Father     Glaucoma No family hx of     Macular Degeneration No family hx of    Maternal grandmother-   Social Hx:  Single. Lives with her 3 yo daughter, Lavinia.  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   She is [place], but was born in [facility].  Came to the [place] at age 4.     Social History     Socioeconomic History    Marital status:      Spouse name: Swathi Castro    Number of children: None    Years of education: None    Highest education level: None   Occupational History    Occupation: Student    Social Needs    Financial resource strain: None    Food insecurity:     Worry: None     Inability: None    Transportation needs:     Medical: None     Non-medical: None   Tobacco Use    Smoking status: Never Smoker    Smokeless tobacco: Never Used   Substance and Sexual Activity    Alcohol use: No    Drug use: No    Sexual activity: Yes     Partners: Male   Lifestyle    Physical activity:     Days per week: None     Minutes per session: None    Stress: None   Relationships    Social connections:     Talks on phone: None     Gets together: None     Attends Yazdanism service: None     Active member of club or organization: None     Attends meetings of clubs or organizations: None      Relationship status: None    Intimate partner violence:     Fear of current or ex partner: None     Emotionally abused: None     Physically abused: None     Forced sexual activity: None   Other Topics Concern    None   Social History Narrative    How much exercise per week? none    How much calcium per day? PNV, some foods        How much caffeine per day? none    How much vitamin D per day? PNV    Do you/your family wear seatbelts?  Yes    Do you/your family use safety helmets? n/a    Do you/your family use sunscreen? When in sun    Do you/your family keep firearms in the home? No    Do you/your family have a smoke detector(s)? Yes        March 6, 2019 Adrien James LPN       Current Outpatient Medications   Medication    acetaminophen (TYLENOL) 325 MG tablet    amphetamine-dextroamphetamine (ADDERALL) 5 MG tablet    Ascorbic Acid (VITAMIN C PO)    blood glucose (NO BRAND SPECIFIED) test strip    blood glucose (NO BRAND SPECIFIED) test strip    blood glucose (ONETOUCH VERIO IQ) test strip    blood glucose monitoring (NO BRAND SPECIFIED) meter device kit    Blood Pressure Monitoring (BLOOD PRESSURE CUFF) MISC    Cholecalciferol (VITAMIN D-3) 125 MCG (5000 UT) TABS    Continuous Blood Gluc Sensor (DEXCOM G6 SENSOR) MISC    Continuous Blood Gluc Sensor (DEXCOM G6 SENSOR) MISC    Continuous Blood Gluc Transmit (DEXCOM G6 TRANSMITTER) MISC    Cyanocobalamin (VITAMIN B12 PO)    FOLIC ACID PO    glucagon (GLUCAGON EMERGENCY) 1 MG kit    Injection Device for insulin (INPEN 100-GREY-JANET) TALI    Injection Device for insulin (INPEN 100-GREY-JANET) TALI    insulin glargine (LANTUS PEN) 100 UNIT/ML pen    insulin pen needle (32G X 4 MM) 32G X 4 MM miscellaneous    NOVOLOG PENFILL 100 UNIT/ML soln     No current facility-administered medications for this visit.        No Known Allergies    Physical Exam:  There were no vitals taken for this visit.  GENERAL: healthy, alert and no distress  RESP: no audible wheeze, cough, or  visible cyanosis.  No visible retractions or increased work of breathing.  Able to speak fully in complete sentences.  PSYCH: mentation appears normal, affect normal/bright, judgement and insight intact, normal speech and appearance well-groomed      RESULTS  Lab Results   Component Value Date    A1C 5.6 04/04/2022    A1C 6.2 (H) 08/25/2021    A1C 6.1 (H) 04/30/2021    A1C 6.7 (H) 01/05/2021    A1C 6.0 (H) 04/05/2019    A1C 7.6 (H) 03/06/2019    A1C 8.8 (H) 02/18/2019    HEMOGLOBINA1 5.1 01/06/2020    HEMOGLOBINA1 5.1 07/12/2019    HEMOGLOBINA1 5.3 05/24/2019    HEMOGLOBINA1 5.7 04/01/2019    HEMOGLOBINA1 10.8 (A) 01/23/2019       TSH   Date Value Ref Range Status   01/05/2021 2.49 0.40 - 4.00 mU/L Final   04/05/2019 1.59 0.40 - 4.00 mU/L Final   05/14/2018 1.48 0.40 - 4.00 mU/L Final       ALT   Date Value Ref Range Status   08/25/2021 26 0 - 50 U/L Final   10/10/2019 25 0 - 50 U/L Final   10/08/2019 31 0 - 50 U/L Final   ]    Recent Labs   Lab Test 08/25/21  1554 04/30/21  1032   CHOL 188 155   HDL 50 50   LDL 98 95   TRIG 201* 51       Lab Results   Component Value Date     08/25/2021     04/30/2021      Lab Results   Component Value Date    POTASSIUM 3.9 08/25/2021    POTASSIUM 3.8 04/30/2021     Lab Results   Component Value Date    CHLORIDE 107 08/25/2021    CHLORIDE 109 04/30/2021     Lab Results   Component Value Date    UNA 9.2 08/25/2021    UNA 8.5 04/30/2021     Lab Results   Component Value Date    CO2 27 08/25/2021    CO2 24 04/30/2021     Lab Results   Component Value Date    BUN 12 08/25/2021    BUN 18 04/30/2021     Lab Results   Component Value Date    CR 0.71 08/25/2021    CR 0.68 04/30/2021       GFR Estimate   Date Value Ref Range Status   08/25/2021 >90 >60 mL/min/1.73m2 Final     Comment:     As of July 11, 2021, eGFR is calculated by the CKD-EPI creatinine equation, without race adjustment. eGFR can be influenced by muscle mass, exercise, and diet. The reported eGFR is an  estimation only and is only applicable if the renal function is stable.   04/30/2021 >90 >60 mL/min/[1.73_m2] Final     Comment:     Non  GFR Calc  Starting 12/18/2018, serum creatinine based estimated GFR (eGFR) will be   calculated using the Chronic Kidney Disease Epidemiology Collaboration   (CKD-EPI) equation.     01/05/2021 >90 >60 mL/min/[1.73_m2] Final     Comment:     Non  GFR Calc  Starting 12/18/2018, serum creatinine based estimated GFR (eGFR) will be   calculated using the Chronic Kidney Disease Epidemiology Collaboration   (CKD-EPI) equation.     10/08/2019 >90 >60 mL/min/[1.73_m2] Final     Comment:     Non  GFR Calc  Starting 12/18/2018, serum creatinine based estimated GFR (eGFR) will be   calculated using the Chronic Kidney Disease Epidemiology Collaboration   (CKD-EPI) equation.       GFR Estimate If Black   Date Value Ref Range Status   04/30/2021 >90 >60 mL/min/[1.73_m2] Final     Comment:      GFR Calc  Starting 12/18/2018, serum creatinine based estimated GFR (eGFR) will be   calculated using the Chronic Kidney Disease Epidemiology Collaboration   (CKD-EPI) equation.     01/05/2021 >90 >60 mL/min/[1.73_m2] Final     Comment:      GFR Calc  Starting 12/18/2018, serum creatinine based estimated GFR (eGFR) will be   calculated using the Chronic Kidney Disease Epidemiology Collaboration   (CKD-EPI) equation.     10/08/2019 >90 >60 mL/min/[1.73_m2] Final     Comment:      GFR Calc  Starting 12/18/2018, serum creatinine based estimated GFR (eGFR) will be   calculated using the Chronic Kidney Disease Epidemiology Collaboration   (CKD-EPI) equation.         Lab Results   Component Value Date    MICROL <5 04/04/2022    MICROL 9 01/05/2021     No results found for: MICROALBUMIN  Lab Results   Component Value Date    CPEPT 3.1 07/10/2019    GADAB 72.1 (H) 02/14/2019    ISCAB <1:4 02/14/2019       No results  found for: B12]    Most recent eye exam date: : Not Found     Glutamic Acid Decarboxylase Antibody   Date Value Ref Range Status   2019 72.1 (H) 0.0 - 5.0 IU/mL Final     Comment:     (Note)  INTERPRETIVE INFORMATION:  Glutamic Acid Decarboxylase   Antibody  A value greater than 5.0 IU/mL is considered positive for   Glutamic Acid Decarboxylase Antibody (KEISHA Ab). This assay   is intended for the semi-quantitative determination of the   KEISHA Ab in human serum. Results should be interpreted within   the context of clinical symptoms.  Performed by [facility],  [facility] [place] [address] [phone]  [email or web address]", Solomon Clark MD, Lab. Director       C Peptide   Date Value Ref Range Status   07/10/2019 3.1 0.9 - 6.9 ng/mL Final     Assessment/Plan:     1.  Type 1 diabetes-  KEISHA positive- no insulin requirements for the past few years since delivery of her daughter.  She does have some post-prandial spikes up to 180 occasionally, but they do not stay high.  Will prescribe insulin for her, but she does not seem to need it at this time. She is likely honeymooning, and her insulin requirements could increase rapidly, particularly in the context of illness. Will prescribe ketone strips, as she is at risk for DKA.  She feels comfortable wearing her dexcom sensor regularly and watching her glucose closely.  Will contact me if she sees her glucose values going over 140 mg/dL.      have prescribed insulin for you.  No need to take Lantus now, but you may need correction Novolog.    Take 1 unit/100 mg/dL over 175 mg/dL.      Lab [phone]    Schedule annually with me and with diabetes education.  Consider Omnipod pump if you need insulin in the future.  [email or web address]    Schedule annual diabetic eye exam.       Emergency issues: Here are some concerns you should contact us about.  -Vomiting: more than twice.  Please check ketones.  If positive, go to ER. Monitor glucose hourly.   -High glucose  (over 300 mg/dL twice in a row): Please check ketones.  If ketones are negative, take an insulin correction and recheck glucose in 1 hour.  If glucose is not coming down, please call the clinic. If ketones are moderate or large, drink lots of water, take an insulin correction 1.5 times your usual correction, and recheck ketones in 1 hour.  If ketones are still present (or you are vomiting), go to the ER.  -Hypoglycemia (low glucose):   If glucose is less than 70 mg/dL, treat with 15g carb (4 glucose tablets), recheck glucose in 15 minutes.  If low again, repeat.   If glucose is less than 54 mg/dL, treat with 30g carb, recheck glucose in 15 minutes.  If low again, repeat.  Keep glucagon in your home in case of severe hypoglycemia and train someone how to use this.    Emergency kit (please ensure you always have these with you):   Glucose tablets  Glucagon  Insulin  Syringes/needles   Extra infusion set (if on a pump)  Ketone strips    Contact information:   If you have concerns, please send me a [employer]iwon message or call the clinic at [phone].  For more urgent concerns, please call [phone] after hours/weekends and ask to speak with the endocrinologist on call.      Please let me know if you are having low blood sugars less than 70 or over 350 mg/dL.  Do not wait until your next appointment if this is happening.      2. Risk factors-     Retinopathy:  No. Due for eye exam. Referral placed.  Nephropathy:  BP historically well controlled. No microalbuminuria.  Creatinine stable.   Neuropathy: No.    Feet: OK, no ulcers.   Lipids:  LDL at target.     3. F/U every 6-12 months, sooner with hyperglycemia.     42 minutes spent on the date of the encounter doing chart review, review of test results, review of continuous glucose sensor, interpretation of glucose data, patient visit and documentation, counseling/coordination of care, and discussion of follow up plan for worsening hyper and hypoglycemia.  The patient  understood and is satisfied with today's visit.        Soni Daniel PA-C, MPAS   [facility]  Department of Medicine  Division of Endocrinology and Diabetes        Answers for HPI/ROS submitted by the patient on 5/2/2023  General Symptoms: No  Skin Symptoms: No  HENT Symptoms: No  EYE SYMPTOMS: No  HEART SYMPTOMS: No  LUNG SYMPTOMS: No  INTESTINAL SYMPTOMS: No  URINARY SYMPTOMS: No  GYNECOLOGIC SYMPTOMS: No  BREAST SYMPTOMS: No  SKELETAL SYMPTOMS: No  BLOOD SYMPTOMS: No  NERVOUS SYSTEM SYMPTOMS: No  MENTAL HEALTH SYMPTOMS: No

## 2023-05-02 NOTE — PATIENT INSTRUCTIONS
Nice seeing you, Devin!    I have prescribed insulin for you.  No need to take Lantus now, but you may need correction Novolog.    Take 1 unit/100 mg/dL over 175 mg/dL.      Lab [phone]    Schedule annually with me and with diabetes education.  Consider Omnipod pump if you need insulin in the future.  [email or web address]    Schedule annual diabetic eye exam.       Emergency issues: Here are some concerns you should contact us about.  -Vomiting: more than twice.  Please check ketones.  If positive, go to ER. Monitor glucose hourly.   -High glucose (over 300 mg/dL twice in a row): Please check ketones.  If ketones are negative, take an insulin correction and recheck glucose in 1 hour.  If glucose is not coming down, please call the clinic. If ketones are moderate or large, drink lots of water, take an insulin correction 1.5 times your usual correction, and recheck ketones in 1 hour.  If ketones are still present (or you are vomiting), go to the ER.  -Hypoglycemia (low glucose):   If glucose is less than 70 mg/dL, treat with 15g carb (4 glucose tablets), recheck glucose in 15 minutes.  If low again, repeat.   If glucose is less than 54 mg/dL, treat with 30g carb, recheck glucose in 15 minutes.  If low again, repeat.  Keep glucagon in your home in case of severe hypoglycemia and train someone how to use this.    Emergency kit (please ensure you always have these with you):   Glucose tablets  Glucagon  Insulin  Syringes/needles   Extra infusion set (if on a pump)  Ketone strips    Contact information:   If you have concerns, please send me a [employer] message or call the clinic at [phone].  For more urgent concerns, please call [phone] after hours/weekends and ask to speak with the endocrinologist on call.      Please let me know if you are having low blood sugars less than 70 or over 350 mg/dL.  Do not wait until your next appointment if this is happening.

## 2023-05-23 ENCOUNTER — LAB (OUTPATIENT)
Dept: LAB | Facility: HOSPITAL | Age: 23
End: 2023-05-23
Payer: COMMERCIAL

## 2023-05-23 DIAGNOSIS — E10.9 WELL CONTROLLED TYPE 1 DIABETES MELLITUS (H): ICD-10-CM

## 2023-05-23 LAB
ANION GAP SERPL CALCULATED.3IONS-SCNC: 10 MMOL/L (ref 7–15)
BUN SERPL-MCNC: 15.9 MG/DL (ref 6–20)
CALCIUM SERPL-MCNC: 9.6 MG/DL (ref 8.6–10)
CHLORIDE SERPL-SCNC: 104 MMOL/L (ref 98–107)
CHOLEST SERPL-MCNC: 187 MG/DL
CREAT SERPL-MCNC: 0.54 MG/DL (ref 0.51–0.95)
CREAT UR-MCNC: 166.5 MG/DL
DEPRECATED HCO3 PLAS-SCNC: 21 MMOL/L (ref 22–29)
EST. AVERAGE GLUCOSE BLD GHB EST-MCNC: 143 MG/DL
GFR SERPL CREATININE-BSD FRML MDRD: >90 ML/MIN/1.73M2
GLUCOSE SERPL-MCNC: 175 MG/DL (ref 70–99)
HBA1C MFR BLD: 6.6 %
HDLC SERPL-MCNC: 73 MG/DL
LDLC SERPL CALC-MCNC: 101 MG/DL
MICROALBUMIN UR-MCNC: <12 MG/L
MICROALBUMIN/CREAT UR: NORMAL MG/G{CREAT}
NONHDLC SERPL-MCNC: 114 MG/DL
POTASSIUM SERPL-SCNC: 4.1 MMOL/L (ref 3.4–5.3)
SODIUM SERPL-SCNC: 135 MMOL/L (ref 136–145)
TRIGL SERPL-MCNC: 67 MG/DL
TSH SERPL DL<=0.005 MIU/L-ACNC: 2.6 UIU/ML (ref 0.3–4.2)

## 2023-05-23 PROCEDURE — 84443 ASSAY THYROID STIM HORMONE: CPT

## 2023-05-23 PROCEDURE — 36415 COLL VENOUS BLD VENIPUNCTURE: CPT

## 2023-05-23 PROCEDURE — 83036 HEMOGLOBIN GLYCOSYLATED A1C: CPT

## 2023-05-23 PROCEDURE — 82570 ASSAY OF URINE CREATININE: CPT

## 2023-05-23 PROCEDURE — 80061 LIPID PANEL: CPT

## 2023-05-23 PROCEDURE — 86364 TISS TRNSGLTMNASE EA IG CLAS: CPT

## 2023-05-23 PROCEDURE — 80048 BASIC METABOLIC PNL TOTAL CA: CPT

## 2023-05-26 LAB
TTG IGA SER-ACNC: 2.4 U/ML
TTG IGG SER-ACNC: 0.8 U/ML

## 2023-06-04 ENCOUNTER — HEALTH MAINTENANCE LETTER (OUTPATIENT)
Age: 23
End: 2023-06-04

## 2023-06-18 DIAGNOSIS — E10.9 WELL CONTROLLED TYPE 1 DIABETES MELLITUS (H): ICD-10-CM

## 2023-06-18 DIAGNOSIS — L81.9 HYPERPIGMENTATION: Primary | ICD-10-CM

## 2023-08-02 DIAGNOSIS — E10.9 WELL CONTROLLED TYPE 1 DIABETES MELLITUS (H): ICD-10-CM

## 2023-08-02 DIAGNOSIS — E10.65 TYPE 1 DIABETES MELLITUS WITH HYPERGLYCEMIA (H): ICD-10-CM

## 2023-08-02 DIAGNOSIS — E10.9 TYPE 1 DIABETES MELLITUS WITHOUT COMPLICATION (H): ICD-10-CM

## 2023-08-02 RX ORDER — INSULIN ASPART 100 [IU]/ML
INJECTION, SOLUTION INTRAVENOUS; SUBCUTANEOUS
Qty: 15 ML | Refills: 3 | Status: SHIPPED | OUTPATIENT
Start: 2023-08-02 | End: 2023-08-03

## 2023-08-02 RX ORDER — PROCHLORPERAZINE 25 MG/1
SUPPOSITORY RECTAL
Qty: 1 EACH | Refills: 4 | Status: SHIPPED | OUTPATIENT
Start: 2023-08-02 | End: 2023-10-09

## 2023-08-02 RX ORDER — PROCHLORPERAZINE 25 MG/1
1 SUPPOSITORY RECTAL
Qty: 3 EACH | Refills: 11 | Status: SHIPPED | OUTPATIENT
Start: 2023-08-02 | End: 2023-10-09

## 2023-08-03 DIAGNOSIS — E10.9 WELL CONTROLLED TYPE 1 DIABETES MELLITUS (H): ICD-10-CM

## 2023-08-03 RX ORDER — INSULIN ASPART 100 [IU]/ML
INJECTION, SOLUTION INTRAVENOUS; SUBCUTANEOUS
Qty: 15 ML | Refills: 3 | Status: SHIPPED | OUTPATIENT
Start: 2023-08-03 | End: 2023-10-09

## 2023-08-03 RX ORDER — INSULIN ASPART 100 [IU]/ML
INJECTION, SOLUTION INTRAVENOUS; SUBCUTANEOUS
Qty: 15 ML | Refills: 3 | Status: SHIPPED | OUTPATIENT
Start: 2023-08-03 | End: 2023-08-03

## 2023-08-03 NOTE — TELEPHONE ENCOUNTER
Provider notified.     Re    Outpatient Medication Detail     Disp Refills Start End AMY   NOVOLOG FLEXPEN 100 UNIT/ML soln 15 mL 3 8/3/2023  Yes   Sig: Take correction 1/100 over 175 mg/dL.   Sent to pharmacy as: NovoLOG FlexPen 100 UNIT/ML Subcutaneous Solution Pen-injector   Class: E-Prescribe   Order: [id number]   E-Prescribing Status: Receipt confirmed by pharmacy (8/3/2023  8:34 AM CDT)

## 2023-08-31 ENCOUNTER — MYC MEDICAL ADVICE (OUTPATIENT)
Dept: ENDOCRINOLOGY | Facility: CLINIC | Age: 23
End: 2023-08-31
Payer: COMMERCIAL

## 2023-10-09 ENCOUNTER — MYC MEDICAL ADVICE (OUTPATIENT)
Dept: ENDOCRINOLOGY | Facility: CLINIC | Age: 23
End: 2023-10-09
Payer: COMMERCIAL

## 2023-10-09 DIAGNOSIS — E10.9 WELL CONTROLLED TYPE 1 DIABETES MELLITUS (H): ICD-10-CM

## 2023-10-09 DIAGNOSIS — E10.65 TYPE 1 DIABETES MELLITUS WITH HYPERGLYCEMIA (H): ICD-10-CM

## 2023-10-09 RX ORDER — PROCHLORPERAZINE 25 MG/1
1 SUPPOSITORY RECTAL
Qty: 3 EACH | Refills: 11 | Status: SHIPPED | OUTPATIENT
Start: 2023-10-09 | End: 2023-11-13

## 2023-10-09 RX ORDER — PROCHLORPERAZINE 25 MG/1
SUPPOSITORY RECTAL
Qty: 1 EACH | Refills: 4 | Status: SHIPPED | OUTPATIENT
Start: 2023-10-09 | End: 2023-11-13

## 2023-10-09 RX ORDER — INSULIN ASPART 100 [IU]/ML
INJECTION, SOLUTION INTRAVENOUS; SUBCUTANEOUS
Qty: 15 ML | Refills: 3 | Status: SHIPPED | OUTPATIENT
Start: 2023-10-09 | End: 2023-10-26

## 2023-10-09 NOTE — LETTER
10/9/2023       RE: Fabio Lacey  [address]  [facility] [address]     To Whom It May Concern,     I am writing on behalf of my patient, Fabio Beltran has type 1 diabetes and struggles with large swings in her glucose, particularly during overnight shifts at work.  Her glucose is much more stable when she has a predictable and regular day/evening schedule.  Please provide the necessary accommodations at work to allow her to work day or evening shifts rather than night shifts.      Please do not hesitate to contact me if you have any questions.      Sincerely,         Soni Daniel PA-C, MPAS   [facility]  Department of Medicine  Division of Endocrinology and Diabetes

## 2023-10-16 ENCOUNTER — TELEPHONE (OUTPATIENT)
Dept: ENDOCRINOLOGY | Facility: CLINIC | Age: 23
End: 2023-10-16
Payer: COMMERCIAL

## 2023-10-16 NOTE — TELEPHONE ENCOUNTER
Prior Authorization Retail Medication Request    Medication/Dose: Dexcom sensors. 1 every 10 days      ICD code (if different than what is on RX):  E10.65     Previously Tried and Failed:    Rationale:  Type 1 diabetes mellitus with hyperglycemia     Insurance Name:    Insurance ID:        Pharmacy Information (if different than what is on RX)  Name:    Phone:

## 2023-10-18 NOTE — TELEPHONE ENCOUNTER
Central Prior Authorization Team   Phone: [phone]    PA Initiation    Medication: Dexcom sensors   Insurance Company: [employer] - Phone [phone] Fax [phone]  Pharmacy Filling the Rx: [facility] [place], [place] - [address] [place] [address]  Filling Pharmacy Phone: [phone]  Filling Pharmacy Fax:    Start Date: 10/18/2023

## 2023-10-20 NOTE — TELEPHONE ENCOUNTER
PRIOR AUTHORIZATION DENIED    Medication: Dexcom sensors -PA DENIED     Denial Date: 10/18/2023    Denial Rational:             Appeal Information:

## 2023-10-25 NOTE — PROGRESS NOTES
"Outcome for 10/25/23 4:37 PM: Data obtained via Dexcom website  Nancy RockPUSHPA ayala  Start time: 0811  End time: 0834  Provider location: off site- home  Patient location: off site- home.      HPI:     Devin is a very pleasant 24 yo woman here for follow up of type 1 diabetes (KEISHA positive), diagnosed just prior to pregnancy in January, 2019. She also has seen Dr. Correia and Rosibel Garcia in the past.  She recently re-established care in our clinic.  Following the delivery of her baby, her glucose was well controlled and she was taking very little insulin- just 1/30g with \"very sugary foods.\"  She had been on no long acting insulin.  She has continued wearing a dexcom sensor and has stayed healthy, even on no insulin. At her last visit, I did give her some Lantus insulin, but she has not required this and her fasting glucose is in a good range.  She has been only using Novolog for correction.  She likes to keep her glucose tightly controlled and does not feel good when her glucose goes high.  Recently, she has been having some post-meal spikes into the 200s.     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  F/Sat/Sun contract, but recently she has been made to work M-F with rotating shifts.  This has been challenging for her, particularly as a single mom.  She finds it difficult to go to sleep with the different shifts.  Devin is thinking of taking pre-requisite courses for medical school.        Current treatment:   Novolog- 1 unit(s) to drop 50 mg/dl.     Typical day:   sometimes does not eat breakfast- shifts this to lunch.  AM- eats either yogurt/nuts/almond butter/chocolate almond butter  Or everything bagel with eggs.   Lunch- see above  Dinner- salad, steak cubes and vegetables.  Sometimes sweet potatoes.    Following \"cyclical eating\" diet- connected to menstrual cycle.    Devin does understand that she has autoimmune type 1 diabetes and that she is likely honeymooning.  She understands that " she did not have gestational diabetes and that it is very important to continue monitoring her glucose on the dexcom sensor.      Patient wears a Dexcom G6 sensor with an overall average of 124 mg/dL (CV 27%, TIR 93%, hypo 1%, severe hypo 0%) over the past 2 weeks. Recent glucose is as follows:                           Past Medical History:   Diagnosis Date    Type I diabetes mellitus (H)     Recently Dx: Jan 2019       Past Surgical History:   Procedure Laterality Date    NO HISTORY OF SURGERY         Family History   Problem Relation Age of Onset    Gestational Diabetes Mother     Diabetes Type 2  Maternal Grandmother     Heart Disease Father     Hypertension Father     Glaucoma No family hx of     Macular Degeneration No family hx of    Maternal grandmother-   Social Hx:  Single. Lives with her 3 yo daughter, Lavinia.  Working as a nurse at [employer].  She is [place], but was born in [facility].  Came to the [place] at age 4.     Social History     Socioeconomic History    Marital status:      Spouse name: Swathi Castro    Number of children: None    Years of education: None    Highest education level: None   Occupational History    Occupation: Student    Social Needs    Financial resource strain: None    Food insecurity:     Worry: None     Inability: None    Transportation needs:     Medical: None     Non-medical: None   Tobacco Use    Smoking status: Never Smoker    Smokeless tobacco: Never Used   Substance and Sexual Activity    Alcohol use: No    Drug use: No    Sexual activity: Yes     Partners: Male   Lifestyle    Physical activity:     Days per week: None     Minutes per session: None    Stress: None   Relationships    Social connections:     Talks on phone: None     Gets together: None     Attends Shinto service: None     Active member of club or organization: None     Attends meetings of clubs or organizations: None     Relationship status: None    Intimate partner violence:     Fear of current or ex partner:  None     Emotionally abused: None     Physically abused: None     Forced sexual activity: None   Other Topics Concern    None   Social History Narrative    How much exercise per week? none    How much calcium per day? PNV, some foods        How much caffeine per day? none    How much vitamin D per day? PNV    Do you/your family wear seatbelts?  Yes    Do you/your family use safety helmets? n/a    Do you/your family use sunscreen? When in sun    Do you/your family keep firearms in the home? No    Do you/your family have a smoke detector(s)? Yes        March 6, 2019 Adrien James LPN       Current Outpatient Medications   Medication    blood glucose (NO BRAND SPECIFIED) test strip    blood glucose monitoring (NO BRAND SPECIFIED) meter device kit    Continuous Blood Gluc Sensor (DEXCOM G6 SENSOR) MISC    Continuous Blood Gluc Transmit (DEXCOM G6 TRANSMITTER) MISC    dextroamphetamine (DEXTROSTAT) 10 MG tablet    insulin glargine (LANTUS PEN) 100 UNIT/ML pen    insulin pen needle (32G X 4 MM) 32G X 4 MM miscellaneous    KETOSTIX test strip    NOVOLOG FLEXPEN 100 UNIT/ML soln     No current facility-administered medications for this visit.        No Known Allergies    Physical Exam:  There were no vitals taken for this visit.  GENERAL: healthy, alert and no distress  RESP: no audible wheeze, cough, or visible cyanosis.  No visible retractions or increased work of breathing.  Able to speak fully in complete sentences.  PSYCH: mentation appears normal, affect normal/bright, judgement and insight intact, normal speech and appearance well-groomed    RESULTS  Lab Results   Component Value Date    A1C 6.6 (H) 05/23/2023    A1C 5.6 04/04/2022    A1C 6.2 (H) 08/25/2021    A1C 6.1 (H) 04/30/2021    A1C 6.7 (H) 01/05/2021    A1C 6.0 (H) 04/05/2019    A1C 7.6 (H) 03/06/2019    A1C 8.8 (H) 02/18/2019    HEMOGLOBINA1 5.1 01/06/2020    HEMOGLOBINA1 5.1 07/12/2019    HEMOGLOBINA1 5.3 05/24/2019    HEMOGLOBINA1 5.7 04/01/2019     HEMOGLOBINA1 10.8 (A) 01/23/2019       TSH   Date Value Ref Range Status   05/23/2023 2.60 0.30 - 4.20 uIU/mL Final   01/05/2021 2.49 0.40 - 4.00 mU/L Final   04/05/2019 1.59 0.40 - 4.00 mU/L Final   05/14/2018 1.48 0.40 - 4.00 mU/L Final       ALT   Date Value Ref Range Status   08/25/2021 26 0 - 50 U/L Final   10/10/2019 25 0 - 50 U/L Final   10/08/2019 31 0 - 50 U/L Final   ]    Recent Labs   Lab Test 05/23/23  1016 08/25/21  1554   CHOL 187 188   HDL 73 50   * 98   TRIG 67 201*       Lab Results   Component Value Date     05/23/2023     04/30/2021      Lab Results   Component Value Date    POTASSIUM 4.1 05/23/2023    POTASSIUM 3.9 08/25/2021    POTASSIUM 3.8 04/30/2021     Lab Results   Component Value Date    CHLORIDE 104 05/23/2023    CHLORIDE 107 08/25/2021    CHLORIDE 109 04/30/2021     Lab Results   Component Value Date    UNA 9.6 05/23/2023    UNA 8.5 04/30/2021     Lab Results   Component Value Date    CO2 21 05/23/2023    CO2 27 08/25/2021    CO2 24 04/30/2021     Lab Results   Component Value Date    BUN 15.9 05/23/2023    BUN 12 08/25/2021    BUN 18 04/30/2021     Lab Results   Component Value Date    CR 0.54 05/23/2023    CR 0.68 04/30/2021       GFR Estimate   Date Value Ref Range Status   05/23/2023 >90 >60 mL/min/1.73m2 Final     Comment:     eGFR calculated using 2021 CKD-EPI equation.   08/25/2021 >90 >60 mL/min/1.73m2 Final     Comment:     As of July 11, 2021, eGFR is calculated by the CKD-EPI creatinine equation, without race adjustment. eGFR can be influenced by muscle mass, exercise, and diet. The reported eGFR is an estimation only and is only applicable if the renal function is stable.   04/30/2021 >90 >60 mL/min/[1.73_m2] Final     Comment:     Non  GFR Calc  Starting 12/18/2018, serum creatinine based estimated GFR (eGFR) will be   calculated using the Chronic Kidney Disease Epidemiology Collaboration   (CKD-EPI) equation.     01/05/2021 >90 >60  "mL/min/[1.73_m2] Final     Comment:     Non  GFR Calc  Starting 12/18/2018, serum creatinine based estimated GFR (eGFR) will be   calculated using the Chronic Kidney Disease Epidemiology Collaboration   (CKD-EPI) equation.     10/08/2019 >90 >60 mL/min/[1.73_m2] Final     Comment:     Non  GFR Calc  Starting 12/18/2018, serum creatinine based estimated GFR (eGFR) will be   calculated using the Chronic Kidney Disease Epidemiology Collaboration   (CKD-EPI) equation.       GFR Estimate If Black   Date Value Ref Range Status   04/30/2021 >90 >60 mL/min/[1.73_m2] Final     Comment:      GFR Calc  Starting 12/18/2018, serum creatinine based estimated GFR (eGFR) will be   calculated using the Chronic Kidney Disease Epidemiology Collaboration   (CKD-EPI) equation.     01/05/2021 >90 >60 mL/min/[1.73_m2] Final     Comment:      GFR Calc  Starting 12/18/2018, serum creatinine based estimated GFR (eGFR) will be   calculated using the Chronic Kidney Disease Epidemiology Collaboration   (CKD-EPI) equation.     10/08/2019 >90 >60 mL/min/[1.73_m2] Final     Comment:      GFR Calc  Starting 12/18/2018, serum creatinine based estimated GFR (eGFR) will be   calculated using the Chronic Kidney Disease Epidemiology Collaboration   (CKD-EPI) equation.         Lab Results   Component Value Date    MICROL <12.0 05/23/2023    MICROL <5 04/04/2022    MICROL 9 01/05/2021     No results found for: \"MICROALBUMIN\"  Lab Results   Component Value Date    CPEPT 3.1 07/10/2019    GADAB 72.1 (H) 02/14/2019    ISCAB <1:4 02/14/2019       No results found for: \"B12\"]    Most recent eye exam date: : Not Found       Assessment/Plan:     1.  Type 1 diabetes-  KEISHA positive- mnimal insulin requirements for the past few years since delivery of her daughter, however recently she is having more post-prandial hyperglycemia.  Suggested she cover carbs with larger meals (>30g CHO) " with 1/30g.  Could consider switching to 1/2 unit pens in the future, but she has an adequate supply of insulin pens now.  She is likely honeymooning, and her insulin requirements could increase rapidly, particularly in the context of illness. Will prescribe ketone strips, as she is at risk for DKA.  She feels comfortable wearing her dexcom sensor regularly and watching her glucose closely. We made the following plan today (instructions given to patient):      Start taking Novolog 1/30g carb plus correction of 1/100 over 175 mg/dL.     Emergency issues: Here are some concerns you should contact us about.  -Vomiting: more than twice.  Please check ketones.  If positive, go to ER. Monitor glucose hourly.   -High glucose (over 300 mg/dL twice in a row): Please check ketones.  If ketones are negative, take an insulin correction and recheck glucose in 1 hour.  If glucose is not coming down, please call the clinic. If ketones are moderate or large, drink lots of water, take an insulin correction 1.5 times your usual correction, and recheck ketones in 1 hour.  If ketones are still present (or you are vomiting), go to the ER.  -Hypoglycemia (low glucose):   If glucose is less than 70 mg/dL, treat with 15g carb (4 glucose tablets), recheck glucose in 15 minutes.  If low again, repeat.   If glucose is less than 54 mg/dL, treat with 30g carb, recheck glucose in 15 minutes.  If low again, repeat.  Keep glucagon in your home in case of severe hypoglycemia and train someone how to use this.    Emergency kit (please ensure you always have these with you):   Glucose tablets  Glucagon  Insulin  Syringes/needles   Extra infusion set (if on a pump)  Ketone strips    Contact information:   If you have concerns, please send me a [employer] message or call the clinic at [phone].  For more urgent concerns, please call [phone] after hours/weekends and ask to speak with the endocrinologist on call.      Please let me know if you are  having low blood sugars less than 70 or over 350 mg/dL.  Do not wait until your next appointment if this is happening.      2. Risk factors-     Retinopathy:  No. Due for eye exam. Referral placed.  Nephropathy:  BP historically well controlled. No microalbuminuria.  Creatinine stable.   Neuropathy: No.    Feet: OK, no ulcers.   Lipids:  LDL at target.     3. F/U every 6-12 months, sooner with hyperglycemia.     42 minutes spent on the date of the encounter doing chart review, review of test results, review of continuous glucose sensor, interpretation of glucose data, patient visit and documentation, counseling/coordination of care, and discussion of follow up plan for worsening hyper and hypoglycemia.  The patient understood and is satisfied with today's visit.        Soni Daniel PA-C, MPAS   [facility]  Department of Medicine  Division of Endocrinology and Diabetes

## 2023-10-26 ENCOUNTER — VIRTUAL VISIT (OUTPATIENT)
Dept: ENDOCRINOLOGY | Facility: CLINIC | Age: 23
End: 2023-10-26
Payer: COMMERCIAL

## 2023-10-26 VITALS — WEIGHT: 130 LBS | BODY MASS INDEX: 20.36 KG/M2

## 2023-10-26 DIAGNOSIS — E10.9 WELL CONTROLLED TYPE 1 DIABETES MELLITUS (H): ICD-10-CM

## 2023-10-26 PROCEDURE — 99215 OFFICE O/P EST HI 40 MIN: CPT | Mod: VID | Performed by: PHYSICIAN ASSISTANT

## 2023-10-26 RX ORDER — INSULIN ASPART 100 [IU]/ML
INJECTION, SOLUTION INTRAVENOUS; SUBCUTANEOUS
Qty: 15 ML | Refills: 3 | Status: SHIPPED | OUTPATIENT
Start: 2023-10-26

## 2023-10-26 RX ORDER — ACYCLOVIR 400 MG/1
TABLET ORAL
Qty: 9 EACH | Refills: 3 | Status: SHIPPED | OUTPATIENT
Start: 2023-10-26

## 2023-10-26 NOTE — LETTER
"10/26/2023       RE: Fabio Lacey  [address]  [facility] [address]     Dear Colleague,    Thank you for referring your patient, Fabio Lacey, to the [facility] [place] [facility]. Please see a copy of my visit note below.    Outcome for 10/25/23 4:37 PM: Data obtained via Dexcom website  Nancy Jacobsen MA  Start time: 0811  End time: 0834  Provider location: off site- home  Patient location: off site- home.      HPI:     Devin is a very pleasant 24 yo woman here for follow up of type 1 diabetes (KEISHA positive), diagnosed just prior to pregnancy in January, 2019. She also has seen Dr. Correia and Rosibel Garcia in the past.  She recently re-established care in our clinic.  Following the delivery of her baby, her glucose was well controlled and she was taking very little insulin- just 1/30g with \"very sugary foods.\"  She had been on no long acting insulin.  She has continued wearing a dexcom sensor and has stayed healthy, even on no insulin. At her last visit, I did give her some Lantus insulin, but she has not required this and her fasting glucose is in a good range.  She has been only using Novolog for correction.  She likes to keep her glucose tightly controlled and does not feel good when her glucose goes high.  Recently, she has been having some post-meal spikes into the 200s.     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  F/Sat/Sun contract, but recently she has been made to work M-F with rotating shifts.  This has been challenging for her, particularly as a single mom.  She finds it difficult to go to sleep with the different shifts.  Devin is thinking of taking pre-requisite courses for medical school.        Current treatment:   Novolog- 1 unit(s) to drop 50 mg/dl.     Typical day:   sometimes does not eat breakfast- shifts this to lunch.  AM- eats either yogurt/nuts/almond butter/chocolate " "almond butter  Or everything bagel with eggs.   Lunch- see above  Dinner- salad, steak cubes and vegetables.  Sometimes sweet potatoes.    Following \"cyclical eating\" diet- connected to menstrual cycle.    Devin does understand that she has autoimmune type 1 diabetes and that she is likely honeymooning.  She understands that she did not have gestational diabetes and that it is very important to continue monitoring her glucose on the dexcom sensor.      Patient wears a Dexcom G6 sensor with an overall average of 124 mg/dL (CV 27%, TIR 93%, hypo 1%, severe hypo 0%) over the past 2 weeks. Recent glucose is as follows:                           Past Medical History:   Diagnosis Date     Type I diabetes mellitus (H)     Recently Dx: Jan 2019       Past Surgical History:   Procedure Laterality Date     NO HISTORY OF SURGERY         Family History   Problem Relation Age of Onset     Gestational Diabetes Mother      Diabetes Type 2  Maternal Grandmother      Heart Disease Father      Hypertension Father      Glaucoma No family hx of      Macular Degeneration No family hx of    Maternal grandmother-   Social Hx:  Single. Lives with her 3 yo daughter, Lavinia.  Working as a nurse at [employer].  She is [place], but was born in [facility].  Came to the [place] at age 4.     Social History     Socioeconomic History     Marital status:      Spouse name: Swathi Castro     Number of children: None     Years of education: None     Highest education level: None   Occupational History     Occupation: Student    Social Needs     Financial resource strain: None     Food insecurity:     Worry: None     Inability: None     Transportation needs:     Medical: None     Non-medical: None   Tobacco Use     Smoking status: Never Smoker     Smokeless tobacco: Never Used   Substance and Sexual Activity     Alcohol use: No     Drug use: No     Sexual activity: Yes     Partners: Male   Lifestyle     Physical activity:     Days per week: None     Minutes per " session: None     Stress: None   Relationships     Social connections:     Talks on phone: None     Gets together: None     Attends Hindu service: None     Active member of club or organization: None     Attends meetings of clubs or organizations: None     Relationship status: None     Intimate partner violence:     Fear of current or ex partner: None     Emotionally abused: None     Physically abused: None     Forced sexual activity: None   Other Topics Concern     None   Social History Narrative    How much exercise per week? none    How much calcium per day? PNV, some foods        How much caffeine per day? none    How much vitamin D per day? PNV    Do you/your family wear seatbelts?  Yes    Do you/your family use safety helmets? n/a    Do you/your family use sunscreen? When in sun    Do you/your family keep firearms in the home? No    Do you/your family have a smoke detector(s)? Yes        March 6, 2019 Adrien James LPN       Current Outpatient Medications   Medication     blood glucose (NO BRAND SPECIFIED) test strip     blood glucose monitoring (NO BRAND SPECIFIED) meter device kit     Continuous Blood Gluc Sensor (DEXCOM G6 SENSOR) MISC     Continuous Blood Gluc Transmit (DEXCOM G6 TRANSMITTER) MISC     dextroamphetamine (DEXTROSTAT) 10 MG tablet     insulin glargine (LANTUS PEN) 100 UNIT/ML pen     insulin pen needle (32G X 4 MM) 32G X 4 MM miscellaneous     KETOSTIX test strip     NOVOLOG FLEXPEN 100 UNIT/ML soln     No current facility-administered medications for this visit.        No Known Allergies    Physical Exam:  There were no vitals taken for this visit.  GENERAL: healthy, alert and no distress  RESP: no audible wheeze, cough, or visible cyanosis.  No visible retractions or increased work of breathing.  Able to speak fully in complete sentences.  PSYCH: mentation appears normal, affect normal/bright, judgement and insight intact, normal speech and appearance well-groomed    RESULTS  Lab Results    Component Value Date    A1C 6.6 (H) 05/23/2023    A1C 5.6 04/04/2022    A1C 6.2 (H) 08/25/2021    A1C 6.1 (H) 04/30/2021    A1C 6.7 (H) 01/05/2021    A1C 6.0 (H) 04/05/2019    A1C 7.6 (H) 03/06/2019    A1C 8.8 (H) 02/18/2019    HEMOGLOBINA1 5.1 01/06/2020    HEMOGLOBINA1 5.1 07/12/2019    HEMOGLOBINA1 5.3 05/24/2019    HEMOGLOBINA1 5.7 04/01/2019    HEMOGLOBINA1 10.8 (A) 01/23/2019       TSH   Date Value Ref Range Status   05/23/2023 2.60 0.30 - 4.20 uIU/mL Final   01/05/2021 2.49 0.40 - 4.00 mU/L Final   04/05/2019 1.59 0.40 - 4.00 mU/L Final   05/14/2018 1.48 0.40 - 4.00 mU/L Final       ALT   Date Value Ref Range Status   08/25/2021 26 0 - 50 U/L Final   10/10/2019 25 0 - 50 U/L Final   10/08/2019 31 0 - 50 U/L Final   ]    Recent Labs   Lab Test 05/23/23  1016 08/25/21  1554   CHOL 187 188   HDL 73 50   * 98   TRIG 67 201*       Lab Results   Component Value Date     05/23/2023     04/30/2021      Lab Results   Component Value Date    POTASSIUM 4.1 05/23/2023    POTASSIUM 3.9 08/25/2021    POTASSIUM 3.8 04/30/2021     Lab Results   Component Value Date    CHLORIDE 104 05/23/2023    CHLORIDE 107 08/25/2021    CHLORIDE 109 04/30/2021     Lab Results   Component Value Date    UNA 9.6 05/23/2023    UNA 8.5 04/30/2021     Lab Results   Component Value Date    CO2 21 05/23/2023    CO2 27 08/25/2021    CO2 24 04/30/2021     Lab Results   Component Value Date    BUN 15.9 05/23/2023    BUN 12 08/25/2021    BUN 18 04/30/2021     Lab Results   Component Value Date    CR 0.54 05/23/2023    CR 0.68 04/30/2021       GFR Estimate   Date Value Ref Range Status   05/23/2023 >90 >60 mL/min/1.73m2 Final     Comment:     eGFR calculated using 2021 CKD-EPI equation.   08/25/2021 >90 >60 mL/min/1.73m2 Final     Comment:     As of July 11, 2021, eGFR is calculated by the CKD-EPI creatinine equation, without race adjustment. eGFR can be influenced by muscle mass, exercise, and diet. The reported eGFR is an  "estimation only and is only applicable if the renal function is stable.   04/30/2021 >90 >60 mL/min/[1.73_m2] Final     Comment:     Non  GFR Calc  Starting 12/18/2018, serum creatinine based estimated GFR (eGFR) will be   calculated using the Chronic Kidney Disease Epidemiology Collaboration   (CKD-EPI) equation.     01/05/2021 >90 >60 mL/min/[1.73_m2] Final     Comment:     Non  GFR Calc  Starting 12/18/2018, serum creatinine based estimated GFR (eGFR) will be   calculated using the Chronic Kidney Disease Epidemiology Collaboration   (CKD-EPI) equation.     10/08/2019 >90 >60 mL/min/[1.73_m2] Final     Comment:     Non  GFR Calc  Starting 12/18/2018, serum creatinine based estimated GFR (eGFR) will be   calculated using the Chronic Kidney Disease Epidemiology Collaboration   (CKD-EPI) equation.       GFR Estimate If Black   Date Value Ref Range Status   04/30/2021 >90 >60 mL/min/[1.73_m2] Final     Comment:      GFR Calc  Starting 12/18/2018, serum creatinine based estimated GFR (eGFR) will be   calculated using the Chronic Kidney Disease Epidemiology Collaboration   (CKD-EPI) equation.     01/05/2021 >90 >60 mL/min/[1.73_m2] Final     Comment:      GFR Calc  Starting 12/18/2018, serum creatinine based estimated GFR (eGFR) will be   calculated using the Chronic Kidney Disease Epidemiology Collaboration   (CKD-EPI) equation.     10/08/2019 >90 >60 mL/min/[1.73_m2] Final     Comment:      GFR Calc  Starting 12/18/2018, serum creatinine based estimated GFR (eGFR) will be   calculated using the Chronic Kidney Disease Epidemiology Collaboration   (CKD-EPI) equation.         Lab Results   Component Value Date    MICROL <12.0 05/23/2023    MICROL <5 04/04/2022    MICROL 9 01/05/2021     No results found for: \"MICROALBUMIN\"  Lab Results   Component Value Date    CPEPT 3.1 07/10/2019    GADAB 72.1 (H) 02/14/2019    ISCAB <1:4 " "02/14/2019       No results found for: \"B12\"]    Most recent eye exam date: : Not Found       Assessment/Plan:     1.  Type 1 diabetes-  KEISHA positive- mnimal insulin requirements for the past few years since delivery of her daughter, however recently she is having more post-prandial hyperglycemia.  Suggested she cover carbs with larger meals (>30g CHO) with 1/30g.  Could consider switching to 1/2 unit pens in the future, but she has an adequate supply of insulin pens now.  She is likely honeymooning, and her insulin requirements could increase rapidly, particularly in the context of illness. Will prescribe ketone strips, as she is at risk for DKA.  She feels comfortable wearing her dexcom sensor regularly and watching her glucose closely. We made the following plan today (instructions given to patient):      Start taking Novolog 1/30g carb plus correction of 1/100 over 175 mg/dL.     Emergency issues: Here are some concerns you should contact us about.  -Vomiting: more than twice.  Please check ketones.  If positive, go to ER. Monitor glucose hourly.   -High glucose (over 300 mg/dL twice in a row): Please check ketones.  If ketones are negative, take an insulin correction and recheck glucose in 1 hour.  If glucose is not coming down, please call the clinic. If ketones are moderate or large, drink lots of water, take an insulin correction 1.5 times your usual correction, and recheck ketones in 1 hour.  If ketones are still present (or you are vomiting), go to the ER.  -Hypoglycemia (low glucose):   If glucose is less than 70 mg/dL, treat with 15g carb (4 glucose tablets), recheck glucose in 15 minutes.  If low again, repeat.   If glucose is less than 54 mg/dL, treat with 30g carb, recheck glucose in 15 minutes.  If low again, repeat.  Keep glucagon in your home in case of severe hypoglycemia and train someone how to use this.    Emergency kit (please ensure you always have these with you):   Glucose " tablets  Glucagon  Insulin  Syringes/needles   Extra infusion set (if on a pump)  Ketone strips    Contact information:   If you have concerns, please send me a [employer]Â® message or call the clinic at [phone].  For more urgent concerns, please call [phone] after hours/weekends and ask to speak with the endocrinologist on call.      Please let me know if you are having low blood sugars less than 70 or over 350 mg/dL.  Do not wait until your next appointment if this is happening.      2. Risk factors-     Retinopathy:  No. Due for eye exam. Referral placed.  Nephropathy:  BP historically well controlled. No microalbuminuria.  Creatinine stable.   Neuropathy: No.    Feet: OK, no ulcers.   Lipids:  LDL at target.     3. F/U every 6-12 months, sooner with hyperglycemia.     42 minutes spent on the date of the encounter doing chart review, review of test results, review of continuous glucose sensor, interpretation of glucose data, patient visit and documentation, counseling/coordination of care, and discussion of follow up plan for worsening hyper and hypoglycemia.  The patient understood and is satisfied with today's visit.        Soni Daniel PA-C, MPAS   [facility]  Department of Medicine  Division of Endocrinology and Diabetes        Again, thank you for allowing me to participate in the care of your patient.      Sincerely,    Soni Daniel PA-C

## 2023-10-26 NOTE — PATIENT INSTRUCTIONS
Start taking Novolog 1/30g carb plus correction of 1/100 over 175 mg/dL.     Emergency issues: Here are some concerns you should contact us about.  -Vomiting: more than twice.  Please check ketones.  If positive, go to ER. Monitor glucose hourly.   -High glucose (over 300 mg/dL twice in a row): Please check ketones.  If ketones are negative, take an insulin correction and recheck glucose in 1 hour.  If glucose is not coming down, please call the clinic. If ketones are moderate or large, drink lots of water, take an insulin correction 1.5 times your usual correction, and recheck ketones in 1 hour.  If ketones are still present (or you are vomiting), go to the ER.  -Hypoglycemia (low glucose):   If glucose is less than 70 mg/dL, treat with 15g carb (4 glucose tablets), recheck glucose in 15 minutes.  If low again, repeat.   If glucose is less than 54 mg/dL, treat with 30g carb, recheck glucose in 15 minutes.  If low again, repeat.  Keep glucagon in your home in case of severe hypoglycemia and train someone how to use this.    Emergency kit (please ensure you always have these with you):   Glucose tablets  Glucagon  Insulin  Syringes/needles   Extra infusion set (if on a pump)  Ketone strips    Contact information:   If you have concerns, please send me a [employer] message or call the clinic at [phone].  For more urgent concerns, please call [phone] after hours/weekends and ask to speak with the endocrinologist on call.      Please let me know if you are having low blood sugars less than 70 or over 350 mg/dL.  Do not wait until your next appointment if this is happening.

## 2023-10-26 NOTE — LETTER
10/26/2023      RE: Fabio Lacey  [address]  [facility] [address]       To Whom It May Concern,     I am writing on behalf of my patient, Fabio Lacey.  We discussed better ways to manager her diabetes while at work at her appointment today.  She feels comfortable with the plan and will continue working her current shifts at [employer].      Please do not hesitate to contact me if you have any questions or concerns.     Sincerely,         Soni Daniel PA-C

## 2023-10-26 NOTE — NURSING NOTE
Is the patient currently in the state of [place]? YES    Visit mode:VIDEO    If the visit is dropped, the patient can be reconnected by: VIDEO VISIT: Text to cell phone:   Telephone Information:   Mobile [phone]       Will anyone else be joining the visit? NO  (If patient encounters technical issues they should call [phone]    How would you like to obtain your AVS? MyChart    Are changes needed to the allergy or medication list? No    Reason for visit: RECHECK and Video Visit    Lulu LYLE

## 2023-10-26 NOTE — Clinical Note
"10/26/2023       RE: Fabio Lacey  [address]  [facility] [address]     Dear Colleague,    Thank you for referring your patient, Fabio Lacey, to the [facility] [place] at [facility]. Please see a copy of my visit note below.    Outcome for 10/25/23 4:37 PM: Data obtained via Dexcom website  Nancy Jacobsen MA  Start time: 0811  End time: 0834  Provider location: off site- home  Patient location: off site- home.      HPI:     Devin is a very pleasant 24 yo woman here for follow up of type 1 diabetes (KEISHA positive), diagnosed just prior to pregnancy in January, 2019. She also has seen Dr. Correia and Rosibel Garcia in the past.  She recently re-established care in our clinic.  Following the delivery of her baby, her glucose was well controlled and she was taking very little insulin- just 1/30g with \"very sugary foods.\"  She had been on no long acting insulin.  She has continued wearing a dexcom sensor and has stayed healthy, even on no insulin. At her last visit, I did give her some Lantus insulin, but she has not required this and her fasting glucose is in a good range.  She has been only using Novolog for correction.  She likes to keep her glucose tightly controlled and does not feel good when her glucose goes high.  Recently, she has been having some post-meal spikes into the 200s.     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  F/Sat/Sun contract, but recently she has been made to work M-F with rotating shifts.  This has been challenging for her, particularly as a single mom.  She finds it difficult to go to sleep with the different shifts.  Devin is thinking of taking pre-requisite courses for medical school.        Current treatment:   Novolog- 1 unit(s) to drop 50 mg/dl.     Typical day:   sometimes does not eat breakfast- shifts this to lunch.  AM- eats either yogurt/nuts/almond butter/chocolate " "almond butter  Or everything bagel with eggs.   Lunch- see above  Dinner- salad, steak cubes and vegetables.  Sometimes sweet potatoes.    Following \"cyclical eating\" diet- connected to menstrual cycle.    Devin does understand that she has autoimmune type 1 diabetes and that she is likely honeymooning.  She understands that she did not have gestational diabetes and that it is very important to continue monitoring her glucose on the dexcom sensor.      Patient wears a Dexcom G6 sensor with an overall average of 124 mg/dL (CV 27%, TIR 93%, hypo 1%, severe hypo 0%) over the past 2 weeks. Recent glucose is as follows:                           Past Medical History:   Diagnosis Date    Type I diabetes mellitus (H)     Recently Dx: Jan 2019       Past Surgical History:   Procedure Laterality Date    NO HISTORY OF SURGERY         Family History   Problem Relation Age of Onset    Gestational Diabetes Mother     Diabetes Type 2  Maternal Grandmother     Heart Disease Father     Hypertension Father     Glaucoma No family hx of     Macular Degeneration No family hx of    Maternal grandmother-   Social Hx:  Single. Lives with her 3 yo daughter, Lavinia.  Working as a nurse at [employer].  She is [place], but was born in [facility].  Came to the [place] at age 4.     Social History     Socioeconomic History    Marital status:      Spouse name: Swathi Castro    Number of children: None    Years of education: None    Highest education level: None   Occupational History    Occupation: Student    Social Needs    Financial resource strain: None    Food insecurity:     Worry: None     Inability: None    Transportation needs:     Medical: None     Non-medical: None   Tobacco Use    Smoking status: Never Smoker    Smokeless tobacco: Never Used   Substance and Sexual Activity    Alcohol use: No    Drug use: No    Sexual activity: Yes     Partners: Male   Lifestyle    Physical activity:     Days per week: None     Minutes per session: None    Stress: " None   Relationships    Social connections:     Talks on phone: None     Gets together: None     Attends [employer] service: None     Active member of club or organization: None     Attends meetings of clubs or organizations: None     Relationship status: None    Intimate partner violence:     Fear of current or ex partner: None     Emotionally abused: None     Physically abused: None     Forced sexual activity: None   Other Topics Concern    None   Social History Narrative    How much exercise per week? none    How much calcium per day? PNV, some foods        How much caffeine per day? none    How much vitamin D per day? PNV    Do you/your family wear seatbelts?  Yes    Do you/your family use safety helmets? n/a    Do you/your family use sunscreen? When in sun    Do you/your family keep firearms in the home? No    Do you/your family have a smoke detector(s)? Yes        March 6, 2019 Adrien James LPN       Current Outpatient Medications   Medication    blood glucose (NO BRAND SPECIFIED) test strip    blood glucose monitoring (NO BRAND SPECIFIED) meter device kit    Continuous Blood Gluc Sensor (DEXCOM G6 SENSOR) MISC    Continuous Blood Gluc Transmit (DEXCOM G6 TRANSMITTER) MISC    dextroamphetamine (DEXTROSTAT) 10 MG tablet    insulin glargine (LANTUS PEN) 100 UNIT/ML pen    insulin pen needle (32G X 4 MM) 32G X 4 MM miscellaneous    KETOSTIX test strip    NOVOLOG FLEXPEN 100 UNIT/ML soln     No current facility-administered medications for this visit.        No Known Allergies    Physical Exam:  There were no vitals taken for this visit.  GENERAL: healthy, alert and no distress  RESP: no audible wheeze, cough, or visible cyanosis.  No visible retractions or increased work of breathing.  Able to speak fully in complete sentences.  PSYCH: mentation appears normal, affect normal/bright, judgement and insight intact, normal speech and appearance well-groomed    RESULTS  Lab Results   Component Value Date    A1C 6.6 (H)  05/23/2023    A1C 5.6 04/04/2022    A1C 6.2 (H) 08/25/2021    A1C 6.1 (H) 04/30/2021    A1C 6.7 (H) 01/05/2021    A1C 6.0 (H) 04/05/2019    A1C 7.6 (H) 03/06/2019    A1C 8.8 (H) 02/18/2019    HEMOGLOBINA1 5.1 01/06/2020    HEMOGLOBINA1 5.1 07/12/2019    HEMOGLOBINA1 5.3 05/24/2019    HEMOGLOBINA1 5.7 04/01/2019    HEMOGLOBINA1 10.8 (A) 01/23/2019       TSH   Date Value Ref Range Status   05/23/2023 2.60 0.30 - 4.20 uIU/mL Final   01/05/2021 2.49 0.40 - 4.00 mU/L Final   04/05/2019 1.59 0.40 - 4.00 mU/L Final   05/14/2018 1.48 0.40 - 4.00 mU/L Final       ALT   Date Value Ref Range Status   08/25/2021 26 0 - 50 U/L Final   10/10/2019 25 0 - 50 U/L Final   10/08/2019 31 0 - 50 U/L Final   ]    Recent Labs   Lab Test 05/23/23  1016 08/25/21  1554   CHOL 187 188   HDL 73 50   * 98   TRIG 67 201*       Lab Results   Component Value Date     05/23/2023     04/30/2021      Lab Results   Component Value Date    POTASSIUM 4.1 05/23/2023    POTASSIUM 3.9 08/25/2021    POTASSIUM 3.8 04/30/2021     Lab Results   Component Value Date    CHLORIDE 104 05/23/2023    CHLORIDE 107 08/25/2021    CHLORIDE 109 04/30/2021     Lab Results   Component Value Date    UNA 9.6 05/23/2023    UNA 8.5 04/30/2021     Lab Results   Component Value Date    CO2 21 05/23/2023    CO2 27 08/25/2021    CO2 24 04/30/2021     Lab Results   Component Value Date    BUN 15.9 05/23/2023    BUN 12 08/25/2021    BUN 18 04/30/2021     Lab Results   Component Value Date    CR 0.54 05/23/2023    CR 0.68 04/30/2021       GFR Estimate   Date Value Ref Range Status   05/23/2023 >90 >60 mL/min/1.73m2 Final     Comment:     eGFR calculated using 2021 CKD-EPI equation.   08/25/2021 >90 >60 mL/min/1.73m2 Final     Comment:     As of July 11, 2021, eGFR is calculated by the CKD-EPI creatinine equation, without race adjustment. eGFR can be influenced by muscle mass, exercise, and diet. The reported eGFR is an estimation only and is only applicable if the  "renal function is stable.   04/30/2021 >90 >60 mL/min/[1.73_m2] Final     Comment:     Non  GFR Calc  Starting 12/18/2018, serum creatinine based estimated GFR (eGFR) will be   calculated using the Chronic Kidney Disease Epidemiology Collaboration   (CKD-EPI) equation.     01/05/2021 >90 >60 mL/min/[1.73_m2] Final     Comment:     Non  GFR Calc  Starting 12/18/2018, serum creatinine based estimated GFR (eGFR) will be   calculated using the Chronic Kidney Disease Epidemiology Collaboration   (CKD-EPI) equation.     10/08/2019 >90 >60 mL/min/[1.73_m2] Final     Comment:     Non  GFR Calc  Starting 12/18/2018, serum creatinine based estimated GFR (eGFR) will be   calculated using the Chronic Kidney Disease Epidemiology Collaboration   (CKD-EPI) equation.       GFR Estimate If Black   Date Value Ref Range Status   04/30/2021 >90 >60 mL/min/[1.73_m2] Final     Comment:      GFR Calc  Starting 12/18/2018, serum creatinine based estimated GFR (eGFR) will be   calculated using the Chronic Kidney Disease Epidemiology Collaboration   (CKD-EPI) equation.     01/05/2021 >90 >60 mL/min/[1.73_m2] Final     Comment:      GFR Calc  Starting 12/18/2018, serum creatinine based estimated GFR (eGFR) will be   calculated using the Chronic Kidney Disease Epidemiology Collaboration   (CKD-EPI) equation.     10/08/2019 >90 >60 mL/min/[1.73_m2] Final     Comment:      GFR Calc  Starting 12/18/2018, serum creatinine based estimated GFR (eGFR) will be   calculated using the Chronic Kidney Disease Epidemiology Collaboration   (CKD-EPI) equation.         Lab Results   Component Value Date    MICROL <12.0 05/23/2023    MICROL <5 04/04/2022    MICROL 9 01/05/2021     No results found for: \"MICROALBUMIN\"  Lab Results   Component Value Date    CPEPT 3.1 07/10/2019    GADAB 72.1 (H) 02/14/2019    ISCAB <1:4 02/14/2019       No results found for: " "\"B12\"]    Most recent eye exam date: : Not Found       Assessment/Plan:     1.  Type 1 diabetes-  KEISHA positive- mnimal insulin requirements for the past few years since delivery of her daughter, however recently she is having more post-prandial hyperglycemia.  Suggested she cover carbs with larger meals (>30g CHO) with 1/30g.  Could consider switching to 1/2 unit pens in the future, but she has an adequate supply of insulin pens now.  She is likely honeymooning, and her insulin requirements could increase rapidly, particularly in the context of illness. Will prescribe ketone strips, as she is at risk for DKA.  She feels comfortable wearing her dexcom sensor regularly and watching her glucose closely. We made the following plan today (instructions given to patient):      Start taking Novolog 1/30g carb plus correction of 1/100 over 175 mg/dL.     Emergency issues: Here are some concerns you should contact us about.  -Vomiting: more than twice.  Please check ketones.  If positive, go to ER. Monitor glucose hourly.   -High glucose (over 300 mg/dL twice in a row): Please check ketones.  If ketones are negative, take an insulin correction and recheck glucose in 1 hour.  If glucose is not coming down, please call the clinic. If ketones are moderate or large, drink lots of water, take an insulin correction 1.5 times your usual correction, and recheck ketones in 1 hour.  If ketones are still present (or you are vomiting), go to the ER.  -Hypoglycemia (low glucose):   If glucose is less than 70 mg/dL, treat with 15g carb (4 glucose tablets), recheck glucose in 15 minutes.  If low again, repeat.   If glucose is less than 54 mg/dL, treat with 30g carb, recheck glucose in 15 minutes.  If low again, repeat.  Keep glucagon in your home in case of severe hypoglycemia and train someone how to use this.    Emergency kit (please ensure you always have these with you):   Glucose tablets  Glucagon  Insulin  Syringes/needles   Extra " infusion set (if on a pump)  Ketone strips    Contact information:   If you have concerns, please send me a [employer] message or call the clinic at [phone].  For more urgent concerns, please call [phone] after hours/weekends and ask to speak with the endocrinologist on call.      Please let me know if you are having low blood sugars less than 70 or over 350 mg/dL.  Do not wait until your next appointment if this is happening.      2. Risk factors-     Retinopathy:  No. Due for eye exam. Referral placed.  Nephropathy:  BP historically well controlled. No microalbuminuria.  Creatinine stable.   Neuropathy: No.    Feet: OK, no ulcers.   Lipids:  LDL at target.     3. F/U every 6-12 months, sooner with hyperglycemia.     42 minutes spent on the date of the encounter doing chart review, review of test results, review of continuous glucose sensor, interpretation of glucose data, patient visit and documentation, counseling/coordination of care, and discussion of follow up plan for worsening hyper and hypoglycemia.  The patient understood and is satisfied with today's visit.        Soni Daniel PA-C, MPAS   [facility]  Department of Medicine  Division of Endocrinology and Diabetes          Again, thank you for allowing me to participate in the care of your patient.      Sincerely,    Soni Daniel PA-C

## 2023-10-31 ENCOUNTER — TELEPHONE (OUTPATIENT)
Dept: ENDOCRINOLOGY | Facility: CLINIC | Age: 23
End: 2023-10-31
Payer: COMMERCIAL

## 2023-11-06 ENCOUNTER — TELEPHONE (OUTPATIENT)
Dept: ENDOCRINOLOGY | Facility: CLINIC | Age: 23
End: 2023-11-06
Payer: COMMERCIAL

## 2023-11-06 NOTE — TELEPHONE ENCOUNTER
Patient call:     Appointment type: return diabetes   Provider: Fredy   Return date:April 2024   Speciality phone number: [phone]  Additional appointment(s) needed:   Additional notes: LVM and jennifer Camejo on 11/6/2023 at 10:37 AM

## 2023-11-16 ENCOUNTER — TELEPHONE (OUTPATIENT)
Dept: DERMATOLOGY | Facility: CLINIC | Age: 23
End: 2023-11-16
Payer: COMMERCIAL

## 2023-12-13 NOTE — PROVIDER NOTIFICATION
10/01/19 2238   Provider Notification   Provider Name/Title Dr Pope   Method of Notification In Department   Request Evaluate - Remote   Notification Reason Uterine Activity     Reviewed uterine activity with provider, noting tachysystole. No change at this time.   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  Has been seen by Vascular in past.  - c/w home plavix PAD w/ remote RLE angioplasty, R 4th toe OM s/p partial R 4th ray amputation on 10/24, RLE angiogram with AT lithotripsy and AT/peroneal balloon angioplasty 10/27.  Has been seen by Vascular in past.    - holding home plavix  - f/u about restarting plavix on discharge

## 2023-12-17 ENCOUNTER — HEALTH MAINTENANCE LETTER (OUTPATIENT)
Age: 23
End: 2023-12-17

## 2024-04-24 NOTE — TELEPHONE ENCOUNTER
Call to Devin to schedule ultrasound on Wednesday 7/17 at 1015 at [facility] prior to S visit. Dvein agreed to date and time.    Zoë Bond RN   [phone]

## 2024-07-14 ENCOUNTER — HEALTH MAINTENANCE LETTER (OUTPATIENT)
Age: 24
End: 2024-07-14

## 2024-09-07 NOTE — TELEPHONE ENCOUNTER
Fabio Lacey is a 18 year old female who is 13 weeks pregnant and has diabetes  Called patient.  She stated that she is very nauseated and the zofran that she has does not work at all.  She also stated that she has vomited a couple of times, and she has bad acid reflux.  She stated that she took a zantac that is not working at all. She stated that she does have a slight headache.  She is at school and she took her BS at it was 70. She stated that she only ate 2 granola bars today.  Advised her to try and get food or juice into her sytem.  If unable she needs to go to the ER, or if BS< 80 after eating to ER now.  She stated that she will also call her OB/GYN now, also advised her to call Endo.    PATIENT DENIES: decrease in consciousness, flushed skin, deep rapid breathing, weakness, fatigue drowsiness, blurred vision, shallow breathing, dizziness, lightheadedness, dehydration, diarrhea, feeling shaky.    Allergies: No Known Allergies          NURSING PLAN: Nursing advice to patient see above. advised patient to try and eat something to get BS up.  If unable to call 911 and go to ER  ALSO ADVISED TO CALL 911 IF: decrease in consciousness, flushed skin, deep rapid breathing, weakness, fatigue drowsiness, blurred vision, shallow breathing, dizziness, lightheadedness, dehydration, unable to get BS > 80, feeling shaky.    RECOMMENDED DISPOSITION:  See above  Will comply with recommendation: Yes  If further questions/concerns or if symptoms do not improve, worsen or new symptoms develop, call your PCP or [facility] as soon as possible.      Guideline used:Diabtetes problems   Telephone Triage Protocols for Nurses, Fifth Edition, Julissa Lorenz, [place]EL         yes I have personally seen, examined and participated in the care of this patient. I have reviewed all pertinent clinical information, including history, physical exam, plan and the Medical/PA/NP Student’s note and agree except as noted.

## 2025-02-09 ENCOUNTER — HEALTH MAINTENANCE LETTER (OUTPATIENT)
Age: 25
End: 2025-02-09

## 2025-07-19 ENCOUNTER — HEALTH MAINTENANCE LETTER (OUTPATIENT)
Age: 25
End: 2025-07-19

## 2025-08-30 ENCOUNTER — HEALTH MAINTENANCE LETTER (OUTPATIENT)
Age: 25
End: 2025-08-30

## (undated) RX ORDER — FENTANYL CITRATE 50 UG/ML
INJECTION, SOLUTION INTRAMUSCULAR; INTRAVENOUS
Status: DISPENSED
Start: 2019-10-03